# Patient Record
Sex: FEMALE | Race: WHITE | NOT HISPANIC OR LATINO | Employment: OTHER | URBAN - METROPOLITAN AREA
[De-identification: names, ages, dates, MRNs, and addresses within clinical notes are randomized per-mention and may not be internally consistent; named-entity substitution may affect disease eponyms.]

---

## 2017-12-01 ENCOUNTER — HOSPITAL ENCOUNTER (OUTPATIENT)
Dept: GENERAL RADIOLOGY | Facility: HOSPITAL | Age: 72
Discharge: HOME | End: 2017-12-01
Admitting: ORTHOPAEDIC SURGERY

## 2017-12-01 ENCOUNTER — APPOINTMENT (OUTPATIENT)
Dept: PREADMISSION TESTING | Facility: HOSPITAL | Age: 72
End: 2017-12-01

## 2017-12-01 VITALS — HEIGHT: 64 IN | WEIGHT: 146.39 LBS | BODY MASS INDEX: 24.99 KG/M2

## 2017-12-01 DIAGNOSIS — Z01.89 LABORATORY TEST: Primary | ICD-10-CM

## 2017-12-01 LAB
ABO GROUP BLD: NORMAL
ALBUMIN SERPL-MCNC: 4.2 G/DL (ref 3.2–4.8)
ALBUMIN/GLOB SERPL: 1.6 G/DL (ref 1.5–2.5)
ALP SERPL-CCNC: 67 U/L (ref 25–100)
ALT SERPL W P-5'-P-CCNC: 39 U/L (ref 7–40)
ANION GAP SERPL CALCULATED.3IONS-SCNC: 10 MMOL/L (ref 3–11)
APTT PPP: 25.9 SECONDS (ref 24–31)
AST SERPL-CCNC: 40 U/L (ref 0–33)
BILIRUB SERPL-MCNC: 0.2 MG/DL (ref 0.3–1.2)
BUN BLD-MCNC: 20 MG/DL (ref 9–23)
BUN/CREAT SERPL: 25 (ref 7–25)
CALCIUM SPEC-SCNC: 9.6 MG/DL (ref 8.7–10.4)
CHLORIDE SERPL-SCNC: 104 MMOL/L (ref 99–109)
CO2 SERPL-SCNC: 26 MMOL/L (ref 20–31)
CREAT BLD-MCNC: 0.8 MG/DL (ref 0.6–1.3)
DEPRECATED RDW RBC AUTO: 49.1 FL (ref 37–54)
ERYTHROCYTE [DISTWIDTH] IN BLOOD BY AUTOMATED COUNT: 14.4 % (ref 11.3–14.5)
GFR SERPL CREATININE-BSD FRML MDRD: 70 ML/MIN/1.73
GLOBULIN UR ELPH-MCNC: 2.7 GM/DL
GLUCOSE BLD-MCNC: 158 MG/DL (ref 70–100)
HBA1C MFR BLD: 6.6 % (ref 4.8–5.6)
HCT VFR BLD AUTO: 41.8 % (ref 34.5–44)
HGB BLD-MCNC: 13.2 G/DL (ref 11.5–15.5)
INR PPP: 0.96
MCH RBC QN AUTO: 29.1 PG (ref 27–31)
MCHC RBC AUTO-ENTMCNC: 31.6 G/DL (ref 32–36)
MCV RBC AUTO: 92.1 FL (ref 80–99)
PLATELET # BLD AUTO: 240 10*3/MM3 (ref 150–450)
PMV BLD AUTO: 10.6 FL (ref 6–12)
POTASSIUM BLD-SCNC: 4.5 MMOL/L (ref 3.5–5.5)
PROT SERPL-MCNC: 6.9 G/DL (ref 5.7–8.2)
PROTHROMBIN TIME: 10.5 SECONDS (ref 9.6–11.5)
RBC # BLD AUTO: 4.54 10*6/MM3 (ref 3.89–5.14)
RH BLD: POSITIVE
SODIUM BLD-SCNC: 140 MMOL/L (ref 132–146)
WBC NRBC COR # BLD: 6.87 10*3/MM3 (ref 3.5–10.8)

## 2017-12-01 PROCEDURE — 71020 HC CHEST PA AND LATERAL: CPT

## 2017-12-01 PROCEDURE — 36415 COLL VENOUS BLD VENIPUNCTURE: CPT

## 2017-12-01 PROCEDURE — 86901 BLOOD TYPING SEROLOGIC RH(D): CPT

## 2017-12-01 PROCEDURE — 80053 COMPREHEN METABOLIC PANEL: CPT | Performed by: ORTHOPAEDIC SURGERY

## 2017-12-01 PROCEDURE — 85027 COMPLETE CBC AUTOMATED: CPT | Performed by: ORTHOPAEDIC SURGERY

## 2017-12-01 PROCEDURE — 86900 BLOOD TYPING SEROLOGIC ABO: CPT

## 2017-12-01 PROCEDURE — 83036 HEMOGLOBIN GLYCOSYLATED A1C: CPT | Performed by: ORTHOPAEDIC SURGERY

## 2017-12-01 PROCEDURE — 85610 PROTHROMBIN TIME: CPT | Performed by: ORTHOPAEDIC SURGERY

## 2017-12-01 PROCEDURE — 85730 THROMBOPLASTIN TIME PARTIAL: CPT | Performed by: ORTHOPAEDIC SURGERY

## 2017-12-01 RX ORDER — PENICILLIN V POTASSIUM 500 MG/1
500 TABLET ORAL 4 TIMES DAILY
COMMUNITY
End: 2017-12-13 | Stop reason: HOSPADM

## 2017-12-01 RX ORDER — NITROGLYCERIN 0.4 MG/1
0.4 TABLET SUBLINGUAL
COMMUNITY

## 2017-12-01 RX ORDER — HYDROCODONE BITARTRATE AND ACETAMINOPHEN 7.5; 325 MG/1; MG/1
1 TABLET ORAL DAILY PRN
COMMUNITY
End: 2017-12-13 | Stop reason: HOSPADM

## 2017-12-01 RX ORDER — FLUOXETINE HYDROCHLORIDE 40 MG/1
40 CAPSULE ORAL DAILY
COMMUNITY

## 2017-12-01 RX ORDER — DIAZEPAM 5 MG/1
5 TABLET ORAL NIGHTLY PRN
COMMUNITY

## 2017-12-01 RX ORDER — PREDNISONE 2.5 MG
2.5 TABLET ORAL DAILY
COMMUNITY

## 2017-12-01 NOTE — PAT
"CALLED DR. CASON OFFICE AND LEFT A MESSAGE WITH NELLA THAT THE PT. IS ON ABX FOR A URI.  PT STATED THAT SHE IS FEELING \"MUCH BETTER\" AND DENIES A FEVER.  NELLA WILL FORWARD THE MESSAGE TO DR. CASON.   "

## 2017-12-11 ENCOUNTER — ANESTHESIA (OUTPATIENT)
Dept: PERIOP | Facility: HOSPITAL | Age: 72
End: 2017-12-11

## 2017-12-11 ENCOUNTER — ANESTHESIA EVENT (OUTPATIENT)
Dept: PERIOP | Facility: HOSPITAL | Age: 72
End: 2017-12-11

## 2017-12-11 ENCOUNTER — HOSPITAL ENCOUNTER (INPATIENT)
Facility: HOSPITAL | Age: 72
LOS: 2 days | Discharge: HOME HEALTH | End: 2017-12-13
Attending: ORTHOPAEDIC SURGERY | Admitting: ORTHOPAEDIC SURGERY

## 2017-12-11 ENCOUNTER — APPOINTMENT (OUTPATIENT)
Dept: GENERAL RADIOLOGY | Facility: HOSPITAL | Age: 72
End: 2017-12-11

## 2017-12-11 DIAGNOSIS — Z74.09 IMPAIRED MOBILITY AND ADLS: Primary | ICD-10-CM

## 2017-12-11 DIAGNOSIS — Z78.9 IMPAIRED MOBILITY AND ADLS: Primary | ICD-10-CM

## 2017-12-11 PROBLEM — E78.5 HLD (HYPERLIPIDEMIA): Status: ACTIVE | Noted: 2017-12-11

## 2017-12-11 PROBLEM — Z96.611 STATUS POST REVERSE TOTAL REPLACEMENT OF RIGHT SHOULDER: Status: ACTIVE | Noted: 2017-12-11

## 2017-12-11 PROBLEM — F41.9 ANXIETY AND DEPRESSION: Status: ACTIVE | Noted: 2017-12-11

## 2017-12-11 PROBLEM — M19.019 ARTHROPATHY OF SHOULDER REGION: Status: ACTIVE | Noted: 2017-12-11

## 2017-12-11 PROBLEM — F32.A ANXIETY AND DEPRESSION: Status: ACTIVE | Noted: 2017-12-11

## 2017-12-11 PROBLEM — E11.9 DM (DIABETES MELLITUS) (HCC): Status: ACTIVE | Noted: 2017-12-11

## 2017-12-11 LAB
ABO GROUP BLD: NORMAL
BLD GP AB SCN SERPL QL: NEGATIVE
GLUCOSE BLDC GLUCOMTR-MCNC: 183 MG/DL (ref 70–130)
RH BLD: POSITIVE

## 2017-12-11 PROCEDURE — 86850 RBC ANTIBODY SCREEN: CPT | Performed by: ORTHOPAEDIC SURGERY

## 2017-12-11 PROCEDURE — 25010000002 NEOSTIGMINE PER 0.5 MG: Performed by: NURSE ANESTHETIST, CERTIFIED REGISTERED

## 2017-12-11 PROCEDURE — 86923 COMPATIBILITY TEST ELECTRIC: CPT

## 2017-12-11 PROCEDURE — 73020 X-RAY EXAM OF SHOULDER: CPT

## 2017-12-11 PROCEDURE — 0RRJ00Z REPLACEMENT OF RIGHT SHOULDER JOINT WITH REVERSE BALL AND SOCKET SYNTHETIC SUBSTITUTE, OPEN APPROACH: ICD-10-PCS | Performed by: ORTHOPAEDIC SURGERY

## 2017-12-11 PROCEDURE — 63710000001 PREDNISONE PER 5 MG: Performed by: ORTHOPAEDIC SURGERY

## 2017-12-11 PROCEDURE — 25010000003 CEFAZOLIN IN DEXTROSE 2-4 GM/100ML-% SOLUTION: Performed by: ORTHOPAEDIC SURGERY

## 2017-12-11 PROCEDURE — 86901 BLOOD TYPING SEROLOGIC RH(D): CPT | Performed by: ORTHOPAEDIC SURGERY

## 2017-12-11 PROCEDURE — 25010000002 PROPOFOL 10 MG/ML EMULSION: Performed by: NURSE ANESTHETIST, CERTIFIED REGISTERED

## 2017-12-11 PROCEDURE — 25010000002 DEXAMETHASONE PER 1 MG: Performed by: NURSE ANESTHETIST, CERTIFIED REGISTERED

## 2017-12-11 PROCEDURE — C1713 ANCHOR/SCREW BN/BN,TIS/BN: HCPCS | Performed by: ORTHOPAEDIC SURGERY

## 2017-12-11 PROCEDURE — 25010000002 FENTANYL CITRATE (PF) 100 MCG/2ML SOLUTION: Performed by: ANESTHESIOLOGY

## 2017-12-11 PROCEDURE — 25010000002 ONDANSETRON PER 1 MG: Performed by: NURSE ANESTHETIST, CERTIFIED REGISTERED

## 2017-12-11 PROCEDURE — 86900 BLOOD TYPING SEROLOGIC ABO: CPT | Performed by: ORTHOPAEDIC SURGERY

## 2017-12-11 PROCEDURE — 82962 GLUCOSE BLOOD TEST: CPT

## 2017-12-11 PROCEDURE — C1776 JOINT DEVICE (IMPLANTABLE): HCPCS | Performed by: ORTHOPAEDIC SURGERY

## 2017-12-11 PROCEDURE — 25010000002 ROPIVACAINE HCL-NACL 0.2-0.9 % SOLUTION: Performed by: ANESTHESIOLOGY

## 2017-12-11 PROCEDURE — 63710000001 INSULIN LISPRO (HUMAN) PER 5 UNITS: Performed by: NURSE PRACTITIONER

## 2017-12-11 DEVICE — IMPLANTABLE DEVICE: Type: IMPLANTABLE DEVICE | Site: SHOULDER | Status: FUNCTIONAL

## 2017-12-11 DEVICE — CUP SUT UNIVERS REVERS OFFST PLS2 36 RT: Type: IMPLANTABLE DEVICE | Site: SHOULDER | Status: FUNCTIONAL

## 2017-12-11 DEVICE — GLENOSPHERE UNIVERS REVERS S/36 PLS4 LAT: Type: IMPLANTABLE DEVICE | Site: SHOULDER | Status: FUNCTIONAL

## 2017-12-11 DEVICE — STEM HUM/SHLDR UNIVERS REVERS SZ6: Type: IMPLANTABLE DEVICE | Site: SHOULDER | Status: FUNCTIONAL

## 2017-12-11 DEVICE — SCRW GLEN UNIVERS REVERS PERIPH 4.5X30MM: Type: IMPLANTABLE DEVICE | Site: SHOULDER | Status: FUNCTIONAL

## 2017-12-11 DEVICE — SCRW GLEN UNIVERS REVERS CENTRL NL 6.5X20MM: Type: IMPLANTABLE DEVICE | Site: SHOULDER | Status: FUNCTIONAL

## 2017-12-11 RX ORDER — CEFAZOLIN SODIUM 2 G/100ML
2 INJECTION, SOLUTION INTRAVENOUS EVERY 8 HOURS
Status: COMPLETED | OUTPATIENT
Start: 2017-12-11 | End: 2017-12-12

## 2017-12-11 RX ORDER — FAMOTIDINE 20 MG/1
20 TABLET, FILM COATED ORAL ONCE
Status: DISCONTINUED | OUTPATIENT
Start: 2017-12-11 | End: 2017-12-11 | Stop reason: HOSPADM

## 2017-12-11 RX ORDER — ONDANSETRON 2 MG/ML
4 INJECTION INTRAMUSCULAR; INTRAVENOUS EVERY 6 HOURS PRN
Status: DISCONTINUED | OUTPATIENT
Start: 2017-12-11 | End: 2017-12-13 | Stop reason: HOSPADM

## 2017-12-11 RX ORDER — ROPIVACAINE IN 0.9% SOD CHL/PF 0.2% 545ML
6 ELASTOMERIC PUMP, HI VARIABLE RATE INJECTION CONTINUOUS
Status: DISCONTINUED | OUTPATIENT
Start: 2017-12-11 | End: 2017-12-13 | Stop reason: HOSPADM

## 2017-12-11 RX ORDER — FAMOTIDINE 10 MG/ML
20 INJECTION, SOLUTION INTRAVENOUS ONCE
Status: DISCONTINUED | OUTPATIENT
Start: 2017-12-11 | End: 2017-12-11 | Stop reason: HOSPADM

## 2017-12-11 RX ORDER — LIDOCAINE HYDROCHLORIDE 10 MG/ML
INJECTION, SOLUTION INFILTRATION; PERINEURAL AS NEEDED
Status: DISCONTINUED | OUTPATIENT
Start: 2017-12-11 | End: 2017-12-11 | Stop reason: SURG

## 2017-12-11 RX ORDER — HYDRALAZINE HYDROCHLORIDE 20 MG/ML
10 INJECTION INTRAMUSCULAR; INTRAVENOUS EVERY 6 HOURS PRN
Status: DISCONTINUED | OUTPATIENT
Start: 2017-12-11 | End: 2017-12-13 | Stop reason: HOSPADM

## 2017-12-11 RX ORDER — SODIUM CHLORIDE, SODIUM LACTATE, POTASSIUM CHLORIDE, CALCIUM CHLORIDE 600; 310; 30; 20 MG/100ML; MG/100ML; MG/100ML; MG/100ML
9 INJECTION, SOLUTION INTRAVENOUS CONTINUOUS
Status: DISCONTINUED | OUTPATIENT
Start: 2017-12-11 | End: 2017-12-13 | Stop reason: HOSPADM

## 2017-12-11 RX ORDER — PROPOFOL 10 MG/ML
VIAL (ML) INTRAVENOUS CONTINUOUS PRN
Status: DISCONTINUED | OUTPATIENT
Start: 2017-12-11 | End: 2017-12-11 | Stop reason: SURG

## 2017-12-11 RX ORDER — FENTANYL CITRATE 50 UG/ML
50 INJECTION, SOLUTION INTRAMUSCULAR; INTRAVENOUS
Status: DISCONTINUED | OUTPATIENT
Start: 2017-12-11 | End: 2017-12-11 | Stop reason: HOSPADM

## 2017-12-11 RX ORDER — NITROGLYCERIN 0.4 MG/1
0.4 TABLET SUBLINGUAL
Status: DISCONTINUED | OUTPATIENT
Start: 2017-12-11 | End: 2017-12-13 | Stop reason: HOSPADM

## 2017-12-11 RX ORDER — DEXTROSE MONOHYDRATE 25 G/50ML
25 INJECTION, SOLUTION INTRAVENOUS
Status: DISCONTINUED | OUTPATIENT
Start: 2017-12-11 | End: 2017-12-13 | Stop reason: HOSPADM

## 2017-12-11 RX ORDER — SODIUM CHLORIDE 0.9 % (FLUSH) 0.9 %
1-10 SYRINGE (ML) INJECTION AS NEEDED
Status: DISCONTINUED | OUTPATIENT
Start: 2017-12-11 | End: 2017-12-11 | Stop reason: HOSPADM

## 2017-12-11 RX ORDER — ONDANSETRON 2 MG/ML
4 INJECTION INTRAMUSCULAR; INTRAVENOUS ONCE AS NEEDED
Status: DISCONTINUED | OUTPATIENT
Start: 2017-12-11 | End: 2017-12-11 | Stop reason: HOSPADM

## 2017-12-11 RX ORDER — GLYCOPYRROLATE 0.2 MG/ML
INJECTION INTRAMUSCULAR; INTRAVENOUS AS NEEDED
Status: DISCONTINUED | OUTPATIENT
Start: 2017-12-11 | End: 2017-12-11 | Stop reason: SURG

## 2017-12-11 RX ORDER — CEFAZOLIN SODIUM 2 G/100ML
2 INJECTION, SOLUTION INTRAVENOUS ONCE
Status: COMPLETED | OUTPATIENT
Start: 2017-12-11 | End: 2017-12-11

## 2017-12-11 RX ORDER — LIDOCAINE HYDROCHLORIDE 10 MG/ML
0.5 INJECTION, SOLUTION EPIDURAL; INFILTRATION; INTRACAUDAL; PERINEURAL ONCE AS NEEDED
Status: DISCONTINUED | OUTPATIENT
Start: 2017-12-11 | End: 2017-12-11 | Stop reason: HOSPADM

## 2017-12-11 RX ORDER — OXYCODONE AND ACETAMINOPHEN 7.5; 325 MG/1; MG/1
1 TABLET ORAL EVERY 4 HOURS PRN
Status: DISCONTINUED | OUTPATIENT
Start: 2017-12-11 | End: 2017-12-13 | Stop reason: HOSPADM

## 2017-12-11 RX ORDER — DIAZEPAM 5 MG/1
5 TABLET ORAL NIGHTLY PRN
Status: DISCONTINUED | OUTPATIENT
Start: 2017-12-11 | End: 2017-12-13 | Stop reason: HOSPADM

## 2017-12-11 RX ORDER — DOCUSATE SODIUM 100 MG/1
100 CAPSULE, LIQUID FILLED ORAL 2 TIMES DAILY PRN
Status: DISCONTINUED | OUTPATIENT
Start: 2017-12-11 | End: 2017-12-13 | Stop reason: HOSPADM

## 2017-12-11 RX ORDER — LIDOCAINE HYDROCHLORIDE 10 MG/ML
0.2 INJECTION, SOLUTION INFILTRATION; PERINEURAL ONCE
Status: COMPLETED | OUTPATIENT
Start: 2017-12-11 | End: 2017-12-11

## 2017-12-11 RX ORDER — FLUOXETINE HYDROCHLORIDE 20 MG/1
40 CAPSULE ORAL DAILY
Status: DISCONTINUED | OUTPATIENT
Start: 2017-12-11 | End: 2017-12-13 | Stop reason: HOSPADM

## 2017-12-11 RX ORDER — DEXAMETHASONE SODIUM PHOSPHATE 4 MG/ML
INJECTION, SOLUTION INTRA-ARTICULAR; INTRALESIONAL; INTRAMUSCULAR; INTRAVENOUS; SOFT TISSUE AS NEEDED
Status: DISCONTINUED | OUTPATIENT
Start: 2017-12-11 | End: 2017-12-11 | Stop reason: SURG

## 2017-12-11 RX ORDER — SODIUM CHLORIDE, SODIUM LACTATE, POTASSIUM CHLORIDE, CALCIUM CHLORIDE 600; 310; 30; 20 MG/100ML; MG/100ML; MG/100ML; MG/100ML
INJECTION, SOLUTION INTRAVENOUS CONTINUOUS PRN
Status: DISCONTINUED | OUTPATIENT
Start: 2017-12-11 | End: 2017-12-11 | Stop reason: SURG

## 2017-12-11 RX ORDER — OXYCODONE AND ACETAMINOPHEN 7.5; 325 MG/1; MG/1
2 TABLET ORAL EVERY 4 HOURS PRN
Status: DISCONTINUED | OUTPATIENT
Start: 2017-12-11 | End: 2017-12-13 | Stop reason: HOSPADM

## 2017-12-11 RX ORDER — FENTANYL CITRATE 50 UG/ML
INJECTION, SOLUTION INTRAMUSCULAR; INTRAVENOUS AS NEEDED
Status: DISCONTINUED | OUTPATIENT
Start: 2017-12-11 | End: 2017-12-11 | Stop reason: SURG

## 2017-12-11 RX ORDER — ATRACURIUM BESYLATE 10 MG/ML
INJECTION, SOLUTION INTRAVENOUS AS NEEDED
Status: DISCONTINUED | OUTPATIENT
Start: 2017-12-11 | End: 2017-12-11 | Stop reason: SURG

## 2017-12-11 RX ORDER — ONDANSETRON 4 MG/1
4 TABLET, FILM COATED ORAL EVERY 6 HOURS PRN
Status: DISCONTINUED | OUTPATIENT
Start: 2017-12-11 | End: 2017-12-13 | Stop reason: HOSPADM

## 2017-12-11 RX ORDER — PROPOFOL 10 MG/ML
VIAL (ML) INTRAVENOUS AS NEEDED
Status: DISCONTINUED | OUTPATIENT
Start: 2017-12-11 | End: 2017-12-11 | Stop reason: SURG

## 2017-12-11 RX ORDER — PREDNISONE 1 MG/1
2.5 TABLET ORAL DAILY
Status: DISCONTINUED | OUTPATIENT
Start: 2017-12-11 | End: 2017-12-13 | Stop reason: HOSPADM

## 2017-12-11 RX ORDER — NALOXONE HCL 0.4 MG/ML
0.1 VIAL (ML) INJECTION
Status: DISCONTINUED | OUTPATIENT
Start: 2017-12-11 | End: 2017-12-13 | Stop reason: HOSPADM

## 2017-12-11 RX ORDER — HYDROMORPHONE HYDROCHLORIDE 1 MG/ML
1 INJECTION, SOLUTION INTRAMUSCULAR; INTRAVENOUS; SUBCUTANEOUS EVERY 4 HOURS PRN
Status: DISCONTINUED | OUTPATIENT
Start: 2017-12-11 | End: 2017-12-13 | Stop reason: HOSPADM

## 2017-12-11 RX ORDER — BUPIVACAINE HYDROCHLORIDE 2.5 MG/ML
INJECTION, SOLUTION EPIDURAL; INFILTRATION; INTRACAUDAL AS NEEDED
Status: DISCONTINUED | OUTPATIENT
Start: 2017-12-11 | End: 2017-12-11 | Stop reason: SURG

## 2017-12-11 RX ORDER — ONDANSETRON 2 MG/ML
INJECTION INTRAMUSCULAR; INTRAVENOUS AS NEEDED
Status: DISCONTINUED | OUTPATIENT
Start: 2017-12-11 | End: 2017-12-11 | Stop reason: SURG

## 2017-12-11 RX ORDER — ASPIRIN 325 MG
325 TABLET, DELAYED RELEASE (ENTERIC COATED) ORAL DAILY
Status: DISCONTINUED | OUTPATIENT
Start: 2017-12-12 | End: 2017-12-13 | Stop reason: HOSPADM

## 2017-12-11 RX ORDER — NICOTINE POLACRILEX 4 MG
15 LOZENGE BUCCAL
Status: DISCONTINUED | OUTPATIENT
Start: 2017-12-11 | End: 2017-12-13 | Stop reason: HOSPADM

## 2017-12-11 RX ADMIN — SODIUM CHLORIDE, POTASSIUM CHLORIDE, SODIUM LACTATE AND CALCIUM CHLORIDE: 600; 310; 30; 20 INJECTION, SOLUTION INTRAVENOUS at 09:28

## 2017-12-11 RX ADMIN — CEFAZOLIN SODIUM 2 G: 2 INJECTION, SOLUTION INTRAVENOUS at 09:31

## 2017-12-11 RX ADMIN — OXYCODONE HYDROCHLORIDE AND ACETAMINOPHEN 1 TABLET: 7.5; 325 TABLET ORAL at 13:31

## 2017-12-11 RX ADMIN — FLUOXETINE HYDROCHLORIDE 40 MG: 20 CAPSULE ORAL at 13:31

## 2017-12-11 RX ADMIN — GLYCOPYRROLATE 0.4 MG: 0.2 INJECTION, SOLUTION INTRAMUSCULAR; INTRAVENOUS at 10:30

## 2017-12-11 RX ADMIN — FENTANYL CITRATE 100 MCG: 50 INJECTION, SOLUTION INTRAMUSCULAR; INTRAVENOUS at 07:50

## 2017-12-11 RX ADMIN — ATRACURIUM BESYLATE 50 MG: 10 INJECTION, SOLUTION INTRAVENOUS at 09:35

## 2017-12-11 RX ADMIN — LIDOCAINE HYDROCHLORIDE 0.2 ML: 10 INJECTION, SOLUTION EPIDURAL; INFILTRATION; INTRACAUDAL; PERINEURAL at 07:16

## 2017-12-11 RX ADMIN — PROPOFOL 150 MG: 10 INJECTION, EMULSION INTRAVENOUS at 09:35

## 2017-12-11 RX ADMIN — DEXAMETHASONE SODIUM PHOSPHATE 4 MG: 4 INJECTION, SOLUTION INTRAMUSCULAR; INTRAVENOUS at 09:42

## 2017-12-11 RX ADMIN — CEFAZOLIN SODIUM 2 G: 2 INJECTION, SOLUTION INTRAVENOUS at 16:51

## 2017-12-11 RX ADMIN — Medication 6 ML/HR: at 10:33

## 2017-12-11 RX ADMIN — BUPIVACAINE HYDROCHLORIDE 15 ML: 2.5 INJECTION, SOLUTION EPIDURAL; INFILTRATION; INTRACAUDAL; PERINEURAL at 07:58

## 2017-12-11 RX ADMIN — PREDNISONE 2.5 MG: 5 TABLET ORAL at 13:30

## 2017-12-11 RX ADMIN — PROPOFOL 99 MCG/KG/MIN: 10 INJECTION, EMULSION INTRAVENOUS at 09:35

## 2017-12-11 RX ADMIN — INSULIN LISPRO 2 UNITS: 100 INJECTION, SOLUTION INTRAVENOUS; SUBCUTANEOUS at 20:56

## 2017-12-11 RX ADMIN — LIDOCAINE HYDROCHLORIDE 50 MG: 10 INJECTION, SOLUTION INFILTRATION; PERINEURAL at 09:35

## 2017-12-11 RX ADMIN — DOCUSATE SODIUM 100 MG: 100 CAPSULE, LIQUID FILLED ORAL at 13:31

## 2017-12-11 RX ADMIN — OXYCODONE HYDROCHLORIDE AND ACETAMINOPHEN 1 TABLET: 7.5; 325 TABLET ORAL at 19:17

## 2017-12-11 RX ADMIN — EPHEDRINE SULFATE 15 MG: 50 INJECTION INTRAMUSCULAR; INTRAVENOUS; SUBCUTANEOUS at 09:53

## 2017-12-11 RX ADMIN — Medication 3 MG: at 10:30

## 2017-12-11 RX ADMIN — ONDANSETRON 4 MG: 2 INJECTION INTRAMUSCULAR; INTRAVENOUS at 10:30

## 2017-12-11 NOTE — ANESTHESIA POSTPROCEDURE EVALUATION
Patient: Henrietta Perez    Procedure Summary     Date Anesthesia Start Anesthesia Stop Room / Location    12/11/17 0928 1050 [facility] / [facility]       Procedure Diagnosis Surgeon Provider    TOTAL SHOULDER REVERSE ARTHROPLASTY RIGHT  (Right Shoulder) No diagnosis on file. MD Rodolfo Colorado MD          Anesthesia Type: general  Last vitals  /70   Temp   97.6   Pulse   68   Resp   12   SpO2   92     Post Anesthesia Care and Evaluation    Patient location during evaluation: PACU  Patient participation: complete - patient participated  Level of consciousness: sleepy but conscious  Pain score: 0  Pain management: adequate  Airway patency: patent  Anesthetic complications: No anesthetic complications  PONV Status: none  Cardiovascular status: hemodynamically stable and acceptable  Respiratory status: nonlabored ventilation, acceptable, nasal cannula and oral airway  Hydration status: acceptable

## 2017-12-11 NOTE — ANESTHESIA PROCEDURE NOTES
Airway  Urgency: elective    Airway not difficult    General Information and Staff    Patient location during procedure: OR  CRNA: FRANCHESKA TAVARES    Indications and Patient Condition  Indications for airway management: airway protection    Preoxygenated: yes  MILS not maintained throughout  Mask difficulty assessment: 1 - vent by mask    Final Airway Details  Final airway type: endotracheal airway      Successful airway: ETT  Cuffed: yes   Successful intubation technique: direct laryngoscopy  Facilitating devices/methods: intubating stylet  Endotracheal tube insertion site: oral  Blade: Gonzales  Blade size: #2  ETT size: 7.0 mm  Cormack-Lehane Classification: grade I - full view of glottis  Placement verified by: chest auscultation and capnometry   Measured from: lips  ETT to lips (cm): 20  Number of attempts at approach: 1    Additional Comments  Negative epigastric sounds, Breath sound equal bilaterally with symmetric chest rise and fall.  Atraumatic, teeth intact

## 2017-12-11 NOTE — ANESTHESIA PREPROCEDURE EVALUATION
Anesthesia Evaluation     Patient summary reviewed and Nursing notes reviewed   NPO Solid Status: > 8 hours  NPO Liquid Status: > 8 hours     Airway   Mallampati: II  TM distance: >3 FB  Neck ROM: full  no difficulty expected  Dental - normal exam     Pulmonary - normal exam   Cardiovascular   Exercise tolerance: good (4-7 METS)    Rhythm: regular  Rate: normal        Neuro/Psych  GI/Hepatic/Renal/Endo      Musculoskeletal     Abdominal    Substance History      OB/GYN          Other                                    Anesthesia Plan    ASA 2     general     intravenous induction   Anesthetic plan and risks discussed with patient.    ISB for post op pain relief

## 2017-12-11 NOTE — OP NOTE
Date; December 11, 2017    Preoperative diagnosis; right shoulder rotator cuff tear arthropathy    Postoperative diagnosis; same    Procedure; right shoulder reverse total shoulder arthroplasty    Surgeon; Jonathan Edmond M.D.    Assistant; SEBASTIAN Mathews physician assistant    Anesthesia; Gen. with endotracheal airway and interscalene catheter    Complications; none noted    Estimated blood loss; 250 mL    Implants; Arthrex reverse implant;  size 6 humeral stem, 135° neck shaft angle, 6 mm polyethylene liner, small glenoid baseplate, 36+4 glenoid sphere, 20 mm Central compression screw, 30 mm superior and inferior locking screws    Indications; 72-year-old female with severe right shoulder pain.  Physical examination, x-rays and CT arthrogram corroborated the diagnosis of rotator cuff tear arthropathy.  Failed good conservative treatment.  Counseled regarding the risks of surgery.  This includes but is not limited to anesthesia, bleeding, nerve damage, infection, the need for further surgeries, disability and death.  Particularly the complications of infection, dislocation and future loosening requiring revision were noted.    Procedure performed; identified and marked in the holding area.  Consent reviewed.  After the induction of anesthesia she was carefully padded and positioned in the 45° modified beachchair position.  Cervical spine stabilized.  Meticulous sterile prep and drape.  Surgical timeout.  Anatomic landmarks marked.  Deltopectoral approach to the shoulder was utilized.  Meticulous hemostasis.  Cephalic vein mobilized laterally.  We creation of the subacromial, subdeltoid and subcoracoid spaces with adhesions released.  Deep retractors placed.  Minimal tension.  Rotator cuff inspected.  There was complete deficiency of the supraspinatus, infraspinatus and upper half of the subscapularis.  We released the inferior half the subscapularis while palpating and protecting the axillary nerve with a Hohmann  retractor.  We released the inferior humeral capsule directly off the bone.  We externally rotated and exposed the humeral head for a standard cut at 20° version.  Minimal bone resection.  We removed inferior osteophyte development.  We moved directly to the glenoid.  We performed a labrum ectomy and again released the inferior capsule off of the inferior glenoid neck as well protecting the axillary nerve.  We had good appreciation of the glenoid vault from the preoperative CT scan and direct evaluation.  This allowed precise placement of the central pin and the glenoid vault with a good inferior positioning and slight inferior tilt.  We reamed and were very pleased at the circumferential coverage of the vault.  Peripheral reaming was followed by impaction of the small baseplate.  It was secured with a central compression screw and superior and inferior locking screws with good purchase.  The superior screws directed at the base of the coracoid.  We carefully checked and then engaged the 36+4 glenoid sphere.  The Dalton taper was fully engaged and could not be loosened.  We moved back to the humerus.  The size 6 stem had a very secure axial and rotational stability.  The metaphyseal reamer was utilized with posterior offset.  We selected the 6 mm polyethylene liner which had excellent soft tissue tension, it was not excessively tight, and had no impingement or instability.  An excellent range of motion movement was achieved.  We irrigated copiously and impacted the final humeral components.  The final construct was satisfactory.  Irrigation with antibiotics.  Closure in layers.  Please note there was no substantial subscapularis to close.  Absorbable subcuticular stitch and skin glue.  Covaderm and sling.  Anesthesia reversed and stable to the recovery room.      The wound was then closed in layers.  Deep Vicryl sutures with an absorbable subcuticular stitch and skin glue.  Covaderm dressing.  Anesthesia reversed.   Sling applied.  Stable to the recovery room.    Stable to the PACU

## 2017-12-11 NOTE — PLAN OF CARE
Problem: Perioperative Period (Adult)  Goal: Signs and Symptoms of Listed Potential Problems Will be Absent or Manageable (Perioperative Period)  Outcome: Outcome(s) achieved Date Met:  12/11/17

## 2017-12-11 NOTE — H&P
Patient Name: Henrietta Perez  MRN: [id number]  : 1945  DOS: 2017    Attending: Jonathan Edmond MD    Primary Care Provider: Basil Payan MD      Chief complaint:  Right shoulder pain    Subjective   Patient is a 72 y.o. female presented for right shoulder reverse total shoulder arthroplasty by Dr. Edmond under [place]. She tolerated surgery well and is admitted for further medical management. Her shoulder has been painful for over a year, but severe the last 4 months. She had limited ROM.    When seen postop she is doing well. Her pain is well controlled. She denies nausea, shortness of breath or chest pain. No hx of DVT or PE.     ( Above is noted, agree.  No complaints of fever chills nausea or vomiting.  Right shoulder postoperative pain is under good control with interscalene block.  Awaiting occupational therapy session.)wy    Allergies:  Allergies   Allergen Reactions   • Novocain [Procaine] Anaphylaxis and Swelling       Meds:  Prescriptions Prior to Admission   Medication Sig Dispense Refill Last Dose   • diazePAM (VALIUM) 5 MG tablet Take 5 mg by mouth At Night As Needed for Anxiety.   12/10/2017   • FLUoxetine (PROzac) 40 MG capsule Take 40 mg by mouth Daily.   12/10/2017   • HYDROcodone-acetaminophen (NORCO) 7.5-325 MG per tablet Take 1 tablet by mouth Daily As Needed for Moderate Pain .   Past Month   • nitroglycerin (NITROSTAT) 0.4 MG SL tablet Place 0.4 mg under the tongue Every 5 (Five) Minutes As Needed for Chest Pain. Take no more than 3 doses in 15 minutes.   Past Month   • penicillin v potassium (VEETID) 500 MG tablet ( took for sinus infection)wy Take 500 mg by mouth 4 (Four) Times a Day.   Past Week   • Pitavastatin Calcium 4 MG tablet Take 4 mg by mouth Every Night.   12/10/2017   • predniSONE (DELTASONE) 2.5 MG tablet Take 2.5 mg by mouth Daily.   12/10/2017       History:   Past Medical History:   Diagnosis Date   • Anxiety    • Arthritis    • Depression    • Fibromyalgia     • History of blood clotting disorder     PT IS A POOR HISTORIAN, STATED THAT A DERMATOLOGIST DX. HER WITH THIS, NOT SURE WHAT IT IS CALLED THOUGH.     • Tremor    • Wears glasses      Past Surgical History:   Procedure Laterality Date   • HYSTERECTOMY     • KNEE SURGERY     • ROTATOR CUFF REPAIR Right    • SKIN BIOPSY     • WISDOM TOOTH EXTRACTION       History reviewed. No pertinent family history.  Social History   Substance Use Topics   • Smoking status: Former Smoker     Packs/day: 1.00     Years: 25.00     Types: Cigarettes     Quit date: 5/19/2008   • Smokeless tobacco: Never Used   • Alcohol use Yes      Comment: 1 FRUITY DRINK PER WEEK   She is  with 1 child and 3 stepchildren. She is retired hairdresser.     Review of Systems  All systems were reviewed and negative except for:  Respiratory: positive for  dyspnea on exertion     Vital Signs  /72 (BP Location: Left arm, Patient Position: Lying)  Pulse 86  Temp 98.4 °F (36.9 °C) (Temporal Artery )   Resp 17  SpO2 91%    Physical Exam:    General Appearance:    Alert, cooperative, in no acute distress   Head:    Normocephalic, without obvious abnormality, atraumatic   Eyes:            Lids and lashes normal, conjunctivae and sclerae normal, no   icterus, no pallor, corneas clear    Ears:    Ears appear intact with no abnormalities noted   Neck:   No adenopathy, supple, trachea midline, no thyromegaly, no     carotid bruit    Lungs:     Clear to auscultation,respirations regular, even and                   unlabored    Heart:    Regular rhythm and normal rate, normal S1 and S2, no            murmur, no gallop, no rub, no click   Abdomen:     Normal bowel sounds, no masses, no organomegaly, soft        non-tender, non-distended, no guarding, no rebound                 tenderness   Genitalia:    Deferred   Extremities:   RUE with sling. covaderm CDI. Nerve block present. Good movement and cap refill right digits.    Pulses:   Pulses palpable and  equal bilaterally   Skin:   No bleeding, bruising or rash   Neurologic:   Cranial nerves 2 - 12 grossly intact, sensation intact (some numbness right digits)      I reviewed the patient's new clinical results.     Results for MARY CAZARES (MRN [id number]) as of 12/11/2017 15:22   Ref. Range 12/1/2017 13:50   Glucose Latest Ref Range: 70 - 100 mg/dL 158 (H)   Sodium Latest Ref Range: 132 - 146 mmol/L 140   Potassium Latest Ref Range: 3.5 - 5.5 mmol/L 4.5   CO2 Latest Ref Range: 20.0 - 31.0 mmol/L 26.0   Chloride Latest Ref Range: 99 - 109 mmol/L 104   Anion Gap Latest Ref Range: 3.0 - 11.0 mmol/L 10.0   Creatinine Latest Ref Range: 0.60 - 1.30 mg/dL 0.80   BUN Latest Ref Range: 9 - 23 mg/dL 20   BUN/Creatinine Ratio Latest Ref Range: 7.0 - 25.0  25.0   Calcium Latest Ref Range: 8.7 - 10.4 mg/dL 9.6   eGFR Non African Amer Latest Ref Range: >60 mL/min/1.73 70   Alkaline Phosphatase Latest Ref Range: 25 - 100 U/L 67   Total Protein Latest Ref Range: 5.7 - 8.2 g/dL 6.9   ALT (SGPT) Latest Ref Range: 7 - 40 U/L 39   AST (SGOT) Latest Ref Range: 0 - 33 U/L 40 (H)   Total Bilirubin Latest Ref Range: 0.3 - 1.2 mg/dL 0.2 (L)   Albumin Latest Ref Range: 3.20 - 4.80 g/dL 4.20   Globulin Latest Units: gm/dL 2.7   A/G Ratio Latest Ref Range: 1.5 - 2.5 g/dL 1.6   Hemoglobin A1C Latest Ref Range: 4.80 - 5.60 % 6.60 (H)   Protime Latest Ref Range: 9.6 - 11.5 Seconds 10.5   INR Unknown 0.96   aPTT Latest Ref Range: 24.0 - 31.0 seconds 25.9   WBC Latest Ref Range: 3.50 - 10.80 10*3/mm3 6.87   RBC Latest Ref Range: 3.89 - 5.14 10*6/mm3 4.54   Hemoglobin Latest Ref Range: 11.5 - 15.5 g/dL 13.2   Hematocrit Latest Ref Range: 34.5 - 44.0 % 41.8   RDW Latest Ref Range: 11.3 - 14.5 % 14.4   MCV Latest Ref Range: 80.0 - 99.0 fL 92.1   MCH Latest Ref Range: 27.0 - 31.0 pg 29.1   MCHC Latest Ref Range: 32.0 - 36.0 g/dL 31.6 (L)   MPV Latest Ref Range: 6.0 - 12.0 fL 10.6   Platelets Latest Ref Range: 150 - 450 10*3/mm3 240     Assessment  and Plan:   Principal Problem:    Status post reverse total replacement of right shoulder  Active Problems:    Arthropathy of shoulder region    Anxiety and depression    HLD (hyperlipidemia)    DM- new dx based on A1C      Plan  1. PT/OT- RUE sling, pendulum exercises  2. Pain control-prns, interscalene nerve block   3. IS-encourage  4. DVT proph- mechs/ASA  5. Bowel regimen  6. Resume home medications as appropriate  7. Monitor post-op labs  8. DC planning for home, likely tomorrow    Anxiety and depression  - continue home valium and prozac    HLD  - resume statin when ok with Dr. Edmond    DM- new dx  - hgb A1c on 12/1/17 6.6  - Accuchecks ACHS with low dose SSI  - DM educator consult    Seen and examined by me. Agree with above. Discussed with patient and .laya Walker MD  12/11/17  5:42 PM

## 2017-12-11 NOTE — H&P
"Pre-Op H&P  Henrietta Perez  [id number]  1945    Chief complaint: Right shoulder pain    HPI:    Patient is a 72 y.o.female presents with right shoulder pain and found to have right shoulder OA and here today for right reverse total shoulder arthroplasty.     Review of Systems:  General ROS: negative for chills, fever or skin lesions;  No changes since last office visit  Cardiovascular ROS: no chest pain or dyspnea on exertion  Respiratory ROS: no cough, shortness of breath, or wheezing  Heme:  Denies clotting disorder.  Reports she was told she had \"vasculitis\" by Dermatologist    Allergies:   Allergies   Allergen Reactions   • Novocain [Procaine] Anaphylaxis and Swelling       Home Meds:    Prescriptions Prior to Admission   Medication Sig Dispense Refill Last Dose   • diazePAM (VALIUM) 5 MG tablet Take 5 mg by mouth At Night As Needed for Anxiety.   12/10/2017   • FLUoxetine (PROzac) 40 MG capsule Take 40 mg by mouth Daily.   12/10/2017   • HYDROcodone-acetaminophen (NORCO) 7.5-325 MG per tablet Take 1 tablet by mouth Daily As Needed for Moderate Pain .   Past Month   • nitroglycerin (NITROSTAT) 0.4 MG SL tablet Place 0.4 mg under the tongue Every 5 (Five) Minutes As Needed for Chest Pain. Take no more than 3 doses in 15 minutes.   Past Month   • penicillin v potassium (VEETID) 500 MG tablet Take 500 mg by mouth 4 (Four) Times a Day.   Past Week   • Pitavastatin Calcium 4 MG tablet Take 4 mg by mouth Every Night.   12/10/2017   • predniSONE (DELTASONE) 2.5 MG tablet Take 2.5 mg by mouth Daily.   12/10/2017       PMH:   Past Medical History:   Diagnosis Date   • Anxiety    • Arthritis    • Depression    • Fibromyalgia    • History of blood clotting disorder     PT IS A POOR HISTORIAN, STATED THAT A DERMATOLOGIST DX. HER WITH THIS, NOT SURE WHAT IT IS CALLED THOUGH.     • Tremor    • Wears glasses      PSH:    Past Surgical History:   Procedure Laterality Date   • HYSTERECTOMY     • KNEE SURGERY     • ROTATOR " CUFF REPAIR Right    • SKIN BIOPSY     • WISDOM TOOTH EXTRACTION         Immunization History:  Influenza: no  Pneumococcal: yes < 5 years  Tetanus: unknown    Social History:   Tobacco:   History   Smoking Status   • Former Smoker   • Packs/day: 1.00   • Years: 25.00   • Types: Cigarettes   • Quit date: 5/19/2008   Smokeless Tobacco   • Never Used      Alcohol:     History   Alcohol Use   • Yes     Comment: 1 FRUITY DRINK PER WEEK       Vitals:           /71 (BP Location: Right arm, Patient Position: Lying)  Pulse 62  Temp 96.5 °F (35.8 °C) (Temporal Artery )   Resp 16  SpO2 95%    Physical Exam:  General Appearance:    Alert, cooperative, no distress, appears stated age   Head:    Normocephalic, without obvious abnormality, atraumatic   Lungs:     Clear to auscultation bilaterally, respirations unlabored    Heart:   Regular rate and rhythm, S1 and S2 normal, no murmur, rub    or gallop    Abdomen:    Soft, non-tender.  +bowel sounds   Breast Exam:    deferred   Genitalia:    deferred   Extremities:   Extremities normal, atraumatic, no cyanosis or edema   Skin:   Skin color, texture, turgor normal, no rashes or lesions   Neurologic:   Grossly intact   Results Review  I reviewed the patient's new clinical results.    Cancer Staging (if applicable)  Cancer Patient: __ yes _x_no __unknown; If yes, clinical stage T:__ N:__M:__, stage group or __N/A    Impression: Right shoulder osteoarthritis    Plan: Right reverse total shoulder arthroplasty    Alma Delia Hutchinson, APRN   12/11/2017   7:59 AM

## 2017-12-11 NOTE — NURSING NOTE
Acute Pain Service:  On-Q teaching completed with patient and spouse, [place].  Video demonstration, handout and bracelet provided with [facility] on call central phone number.  Instructed to call with any questions or concerns.  Patient verbalized understanding.  Service will continue to follow until catheter DC'd.  Please contact patient at Home:[phone] Cell: [phone] or [facility] Cell at [phone]  if needed.

## 2017-12-11 NOTE — ANESTHESIA PROCEDURE NOTES
Interscalene Cath    Patient location during procedure: pre-op  Stop time: 12/11/2017 7:58 AM  Reason for block: at surgeon's request and post-op pain management  Performed by  Anesthesiologist: MONROE HAGAN  Preanesthetic Checklist  Completed: patient identified, site marked, surgical consent, pre-op evaluation, timeout performed, IV checked, risks and benefits discussed and monitors and equipment checked  Prep:  Pt Position: left lateral decubitus  Sterile barriers:cap, gloves, mask and sterile barriers  Prep: ChloraPrep  Patient monitoring: blood pressure monitoring, continuous pulse oximetry and EKG  Procedure  Sedation:yes    Guidance:ultrasound guided  Images:still images obtained    Laterality:right  Block Type:interscalene  Injection Technique:catheter  Needle Type:Tuohy and echogenic  Needle Gauge:18 G    Catheter Size:20 G (20g)  Cath Depth at skin: 7 cm  Medications  Local Injected:bupivacaine 0.25% Local Amount Injected:15mL  Post Assessment  Injection Assessment: negative aspiration for heme, no paresthesia on injection and incremental injection  Patient Tolerance:comfortable throughout block  Complications:no  Additional Notes  Procedure:                 The pt was placed in semifowlers position with a slight tilt of the thorax contralateral to the insertion site.  The Insertion Site was prepped and draped in sterile fashion.  The pt was anesthetized with  IV Sedation( see meds) and  Skin and cutaneous tissue was infiltrated and anesthetized with 1% Lidocaine 3 mls via a 25g needle.  Utilizing ultrasound guidance, a BBraun 2 inch 18 g Contiplex echogenic touhy needle was advanced in-plane.  Hydro dissection of tissue was achieved with Normal saline. Major vessels(carotid and Internal Jugular) where visualized as the brachial plexus was approached at the approximate level of C-7/ T-1.  Cervical 5 and Branches of Cervical 6 nerve roots where visualized and the needle tip was placed posterior at the  level of C-6 roots.  LA spread was visualized and injection was made incrementally every 5 mls with aspiration. Injection pressure was normal or little, there was no intraneural injection, no vascular injection.      The BBraun 20 g wire stylet  catheter was then placed under US guidance on the posterior aspect of the Brachial Plexus.  Location of catheter was confirmed with NS injection visualized with US . The tuohy was then removed and the skin was sealed with Skin AFix at catheter insertion site.  Skin was prepped with mastisol and the labeled catheter  was secured with steristrips and a CHG tegaderm. Thank You.

## 2017-12-12 LAB
ANION GAP SERPL CALCULATED.3IONS-SCNC: 12 MMOL/L (ref 3–11)
BASOPHILS # BLD AUTO: 0.01 10*3/MM3 (ref 0–0.2)
BASOPHILS NFR BLD AUTO: 0.1 % (ref 0–1)
BUN BLD-MCNC: 12 MG/DL (ref 9–23)
BUN/CREAT SERPL: 17.1 (ref 7–25)
CALCIUM SPEC-SCNC: 9.4 MG/DL (ref 8.7–10.4)
CHLORIDE SERPL-SCNC: 101 MMOL/L (ref 99–109)
CO2 SERPL-SCNC: 26 MMOL/L (ref 20–31)
CREAT BLD-MCNC: 0.7 MG/DL (ref 0.6–1.3)
DEPRECATED RDW RBC AUTO: 48.6 FL (ref 37–54)
EOSINOPHIL # BLD AUTO: 0 10*3/MM3 (ref 0–0.3)
EOSINOPHIL NFR BLD AUTO: 0 % (ref 0–3)
ERYTHROCYTE [DISTWIDTH] IN BLOOD BY AUTOMATED COUNT: 14.3 % (ref 11.3–14.5)
GFR SERPL CREATININE-BSD FRML MDRD: 82 ML/MIN/1.73
GLUCOSE BLD-MCNC: 110 MG/DL (ref 70–100)
GLUCOSE BLDC GLUCOMTR-MCNC: 125 MG/DL (ref 70–130)
GLUCOSE BLDC GLUCOMTR-MCNC: 135 MG/DL (ref 70–130)
GLUCOSE BLDC GLUCOMTR-MCNC: 157 MG/DL (ref 70–130)
GLUCOSE BLDC GLUCOMTR-MCNC: 171 MG/DL (ref 70–130)
HCT VFR BLD AUTO: 37.8 % (ref 34.5–44)
HGB BLD-MCNC: 11.8 G/DL (ref 11.5–15.5)
IMM GRANULOCYTES # BLD: 0.06 10*3/MM3 (ref 0–0.03)
IMM GRANULOCYTES NFR BLD: 0.4 % (ref 0–0.6)
LYMPHOCYTES # BLD AUTO: 2.1 10*3/MM3 (ref 0.6–4.8)
LYMPHOCYTES NFR BLD AUTO: 15.3 % (ref 24–44)
MCH RBC QN AUTO: 29 PG (ref 27–31)
MCHC RBC AUTO-ENTMCNC: 31.2 G/DL (ref 32–36)
MCV RBC AUTO: 92.9 FL (ref 80–99)
MONOCYTES # BLD AUTO: 1.46 10*3/MM3 (ref 0–1)
MONOCYTES NFR BLD AUTO: 10.6 % (ref 0–12)
NEUTROPHILS # BLD AUTO: 10.09 10*3/MM3 (ref 1.5–8.3)
NEUTROPHILS NFR BLD AUTO: 73.6 % (ref 41–71)
PLATELET # BLD AUTO: 253 10*3/MM3 (ref 150–450)
PMV BLD AUTO: 10.7 FL (ref 6–12)
POTASSIUM BLD-SCNC: 4.5 MMOL/L (ref 3.5–5.5)
RBC # BLD AUTO: 4.07 10*6/MM3 (ref 3.89–5.14)
SODIUM BLD-SCNC: 139 MMOL/L (ref 132–146)
WBC NRBC COR # BLD: 13.72 10*3/MM3 (ref 3.5–10.8)

## 2017-12-12 PROCEDURE — G0108 DIAB MANAGE TRN  PER INDIV: HCPCS | Performed by: REGISTERED NURSE

## 2017-12-12 PROCEDURE — 97161 PT EVAL LOW COMPLEX 20 MIN: CPT

## 2017-12-12 PROCEDURE — 97166 OT EVAL MOD COMPLEX 45 MIN: CPT | Performed by: OCCUPATIONAL THERAPIST

## 2017-12-12 PROCEDURE — 25010000003 CEFAZOLIN IN DEXTROSE 2-4 GM/100ML-% SOLUTION: Performed by: ORTHOPAEDIC SURGERY

## 2017-12-12 PROCEDURE — 63710000001 PREDNISONE PER 5 MG: Performed by: ORTHOPAEDIC SURGERY

## 2017-12-12 PROCEDURE — 25010000002 HYDROMORPHONE PER 4 MG: Performed by: ORTHOPAEDIC SURGERY

## 2017-12-12 PROCEDURE — 85025 COMPLETE CBC W/AUTO DIFF WBC: CPT | Performed by: ORTHOPAEDIC SURGERY

## 2017-12-12 PROCEDURE — 97530 THERAPEUTIC ACTIVITIES: CPT | Performed by: OCCUPATIONAL THERAPIST

## 2017-12-12 PROCEDURE — 82962 GLUCOSE BLOOD TEST: CPT

## 2017-12-12 PROCEDURE — 80048 BASIC METABOLIC PNL TOTAL CA: CPT | Performed by: ORTHOPAEDIC SURGERY

## 2017-12-12 PROCEDURE — 25010000002 ONDANSETRON PER 1 MG: Performed by: NURSE PRACTITIONER

## 2017-12-12 RX ADMIN — PREDNISONE 2.5 MG: 5 TABLET ORAL at 08:36

## 2017-12-12 RX ADMIN — FLUOXETINE HYDROCHLORIDE 40 MG: 20 CAPSULE ORAL at 08:35

## 2017-12-12 RX ADMIN — CEFAZOLIN SODIUM 2 G: 2 INJECTION, SOLUTION INTRAVENOUS at 01:37

## 2017-12-12 RX ADMIN — OXYCODONE HYDROCHLORIDE AND ACETAMINOPHEN 1 TABLET: 7.5; 325 TABLET ORAL at 17:18

## 2017-12-12 RX ADMIN — OXYCODONE HYDROCHLORIDE AND ACETAMINOPHEN 1 TABLET: 7.5; 325 TABLET ORAL at 08:45

## 2017-12-12 RX ADMIN — HYDROMORPHONE HYDROCHLORIDE 1 MG: 2 INJECTION INTRAMUSCULAR; INTRAVENOUS; SUBCUTANEOUS at 09:48

## 2017-12-12 RX ADMIN — INSULIN LISPRO 2 UNITS: 100 INJECTION, SOLUTION INTRAVENOUS; SUBCUTANEOUS at 08:36

## 2017-12-12 RX ADMIN — OXYCODONE HYDROCHLORIDE AND ACETAMINOPHEN 1 TABLET: 7.5; 325 TABLET ORAL at 08:36

## 2017-12-12 RX ADMIN — ASPIRIN 325 MG: 325 TABLET, DELAYED RELEASE ORAL at 08:36

## 2017-12-12 RX ADMIN — ONDANSETRON 4 MG: 2 INJECTION INTRAMUSCULAR; INTRAVENOUS at 08:36

## 2017-12-12 RX ADMIN — HYDROMORPHONE HYDROCHLORIDE 1 MG: 2 INJECTION INTRAMUSCULAR; INTRAVENOUS; SUBCUTANEOUS at 18:40

## 2017-12-12 RX ADMIN — INSULIN LISPRO 2 UNITS: 100 INJECTION, SOLUTION INTRAVENOUS; SUBCUTANEOUS at 17:19

## 2017-12-12 NOTE — DISCHARGE PLACEMENT REQUEST
"Mary Perez (72 y.o. Female)     Sonja Kellerman RN,  [phone]      Date of Birth Social Security Number Address Home Phone MRN    1945  [address]  [facility] [address] [phone] [id number]    [employer] Marital Status          None        Admission Date Admission Type Admitting Provider Attending Provider Department, Room/Bed    12/11/17 Elective Jonathan Edmond MD Wilkes, Trevor, MD [address], S384/1    Discharge Date Discharge Disposition Discharge Destination                      Attending Provider: Jonathan Edmond MD     Allergies:  Novocain [Procaine]    Isolation:  None   Infection:  None   Code Status:  FULL    Ht:  162.6 cm (64\")   Wt:  66.4 kg (146 lb 6.2 oz)    Admission Cmt:  None   Principal Problem:  Status post reverse total replacement of right shoulder [Z96.611]                 Active Insurance as of 12/11/2017     Primary Coverage     Payor Plan Insurance Group Employer/Plan Group    MEDICARE MEDICARE A & B      Payor Plan Address Payor Plan Phone Number Effective From Effective To    [address] [phone] 10/1/2008     [place] [address]       Subscriber Name Subscriber Birth Date Member ID       MARY PEREZ 1945 [id number]           Secondary Coverage     Payor Plan Insurance Group Employer/Plan Group    ANTH BLUE CROSS ANTH BLUE CROSS BLUE [facility] PPO [id number]     Payor Plan Address Payor Plan Phone Number Effective From Effective To    [address] [phone] 1/1/2010     [place] [address]       Subscriber Name Subscriber Birth Date Member ID       RADHA PEREZ 11/14/1946 [id number]                 Emergency Contacts      (Rel.) Home Phone Work Phone Mobile Phone    Radha Perez (Spouse) [phone] -- --    Elba Cruz (Daughter) [phone] -- --            Insurance Information                MEDICARE/MEDICARE A & B Phone: [phone]    Subscriber: Chris Mary OLIVEROS " "Subscriber#: [id number]    Group#:  Precert#:         IDA BLUE CROSS/IDA BLUE CROSS BLUE [facility] PPO Phone: [phone]    Subscriber: Hank Perez Subscriber#: [id number]    Group#: [id number] Precert#:              History & Physical      MICHAEL Chiang at 12/11/2017  7:58 AM     Attestation signed by Jonathan Edmond MD at 12/11/2017  8:57 AM        Patient seen and examined in preop.  Agree with above.  Consent reviewed.  Plan to proceed.                               Pre-Op H&P  Henrietta Perez  [id number]  1945    Chief complaint: Right shoulder pain    HPI:    Patient is a 72 y.o.female presents with right shoulder pain and found to have right shoulder OA and here today for right reverse total shoulder arthroplasty.     Review of Systems:  General ROS: negative for chills, fever or skin lesions;  No changes since last office visit  Cardiovascular ROS: no chest pain or dyspnea on exertion  Respiratory ROS: no cough, shortness of breath, or wheezing  Heme:  Denies clotting disorder.  Reports she was told she had \"vasculitis\" by Dermatologist    Allergies:   Allergies   Allergen Reactions   • Novocain [Procaine] Anaphylaxis and Swelling       Home Meds:    Prescriptions Prior to Admission   Medication Sig Dispense Refill Last Dose   • diazePAM (VALIUM) 5 MG tablet Take 5 mg by mouth At Night As Needed for Anxiety.   12/10/2017   • FLUoxetine (PROzac) 40 MG capsule Take 40 mg by mouth Daily.   12/10/2017   • HYDROcodone-acetaminophen (NORCO) 7.5-325 MG per tablet Take 1 tablet by mouth Daily As Needed for Moderate Pain .   Past Month   • nitroglycerin (NITROSTAT) 0.4 MG SL tablet Place 0.4 mg under the tongue Every 5 (Five) Minutes As Needed for Chest Pain. Take no more than 3 doses in 15 minutes.   Past Month   • penicillin v potassium (VEETID) 500 MG tablet Take 500 mg by mouth 4 (Four) Times a Day.   Past Week   • Pitavastatin Calcium 4 MG tablet Take 4 mg by mouth Every Night.   " 12/10/2017   • predniSONE (DELTASONE) 2.5 MG tablet Take 2.5 mg by mouth Daily.   12/10/2017       PMH:   Past Medical History:   Diagnosis Date   • Anxiety    • Arthritis    • Depression    • Fibromyalgia    • History of blood clotting disorder     PT IS A POOR HISTORIAN, STATED THAT A DERMATOLOGIST DX. HER WITH THIS, NOT SURE WHAT IT IS CALLED THOUGH.     • Tremor    • Wears glasses      PSH:    Past Surgical History:   Procedure Laterality Date   • HYSTERECTOMY     • KNEE SURGERY     • ROTATOR CUFF REPAIR Right    • SKIN BIOPSY     • WISDOM TOOTH EXTRACTION         Immunization History:  Influenza: no  Pneumococcal: yes < 5 years  Tetanus: unknown    Social History:   Tobacco:   History   Smoking Status   • Former Smoker   • Packs/day: 1.00   • Years: 25.00   • Types: Cigarettes   • Quit date: 5/19/2008   Smokeless Tobacco   • Never Used      Alcohol:     History   Alcohol Use   • Yes     Comment: 1 FRUITY DRINK PER WEEK       Vitals:           /71 (BP Location: Right arm, Patient Position: Lying)  Pulse 62  Temp 96.5 °F (35.8 °C) (Temporal Artery )   Resp 16  SpO2 95%    Physical Exam:  General Appearance:    Alert, cooperative, no distress, appears stated age   Head:    Normocephalic, without obvious abnormality, atraumatic   Lungs:     Clear to auscultation bilaterally, respirations unlabored    Heart:   Regular rate and rhythm, S1 and S2 normal, no murmur, rub    or gallop    Abdomen:    Soft, non-tender.  +bowel sounds   Breast Exam:    deferred   Genitalia:    deferred   Extremities:   Extremities normal, atraumatic, no cyanosis or edema   Skin:   Skin color, texture, turgor normal, no rashes or lesions   Neurologic:   Grossly intact   Results Review  I reviewed the patient's new clinical results.    Cancer Staging (if applicable)  Cancer Patient: __ yes _x_no __unknown; If yes, clinical stage T:__ N:__M:__, stage group or __N/A    Impression: Right shoulder osteoarthritis    Plan: Right reverse  total shoulder arthroplasty    Alma Delia Hutchinson, APRN   2017   7:59 AM     Electronically signed by Jonathan Edmond MD at 2017  8:57 AM      Bossman Walker MD at 2017  3:20 PM          Patient Name: Henrietta Perez  MRN: [id number]  : 1945  DOS: 2017    Attending: Jonathan Edmond MD    Primary Care Provider: Basil Payan MD      Chief complaint:  Right shoulder pain    Subjective   Patient is a 72 y.o. female presented for right shoulder reverse total shoulder arthroplasty by Dr. Edmond under [place]. She tolerated surgery well and is admitted for further medical management. Her shoulder has been painful for over a year, but severe the last 4 months. She had limited ROM.    When seen postop she is doing well. Her pain is well controlled. She denies nausea, shortness of breath or chest pain. No hx of DVT or PE.     ( Above is noted, agree.  No complaints of fever chills nausea or vomiting.  Right shoulder postoperative pain is under good control with interscalene block.  Awaiting occupational therapy session.)wy    Allergies:  Allergies   Allergen Reactions   • Novocain [Procaine] Anaphylaxis and Swelling       Meds:  Prescriptions Prior to Admission   Medication Sig Dispense Refill Last Dose   • diazePAM (VALIUM) 5 MG tablet Take 5 mg by mouth At Night As Needed for Anxiety.   12/10/2017   • FLUoxetine (PROzac) 40 MG capsule Take 40 mg by mouth Daily.   12/10/2017   • HYDROcodone-acetaminophen (NORCO) 7.5-325 MG per tablet Take 1 tablet by mouth Daily As Needed for Moderate Pain .   Past Month   • nitroglycerin (NITROSTAT) 0.4 MG SL tablet Place 0.4 mg under the tongue Every 5 (Five) Minutes As Needed for Chest Pain. Take no more than 3 doses in 15 minutes.   Past Month   • penicillin v potassium (VEETID) 500 MG tablet ( took for sinus infection)wy Take 500 mg by mouth 4 (Four) Times a Day.   Past Week   • Pitavastatin Calcium 4 MG tablet Take 4 mg by mouth Every Night.    12/10/2017   • predniSONE (DELTASONE) 2.5 MG tablet Take 2.5 mg by mouth Daily.   12/10/2017       History:   Past Medical History:   Diagnosis Date   • Anxiety    • Arthritis    • Depression    • Fibromyalgia    • History of blood clotting disorder     PT IS A POOR HISTORIAN, STATED THAT A DERMATOLOGIST DX. HER WITH THIS, NOT SURE WHAT IT IS CALLED THOUGH.     • Tremor    • Wears glasses      Past Surgical History:   Procedure Laterality Date   • HYSTERECTOMY     • KNEE SURGERY     • ROTATOR CUFF REPAIR Right    • SKIN BIOPSY     • WISDOM TOOTH EXTRACTION       History reviewed. No pertinent family history.  Social History   Substance Use Topics   • Smoking status: Former Smoker     Packs/day: 1.00     Years: 25.00     Types: Cigarettes     Quit date: 5/19/2008   • Smokeless tobacco: Never Used   • Alcohol use Yes      Comment: 1 FRUITY DRINK PER WEEK   She is  with 1 child and 3 stepchildren. She is retired hairdresser.     Review of Systems  All systems were reviewed and negative except for:  Respiratory: positive for  dyspnea on exertion     Vital Signs  /72 (BP Location: Left arm, Patient Position: Lying)  Pulse 86  Temp 98.4 °F (36.9 °C) (Temporal Artery )   Resp 17  SpO2 91%    Physical Exam:    General Appearance:    Alert, cooperative, in no acute distress   Head:    Normocephalic, without obvious abnormality, atraumatic   Eyes:            Lids and lashes normal, conjunctivae and sclerae normal, no   icterus, no pallor, corneas clear    Ears:    Ears appear intact with no abnormalities noted   Neck:   No adenopathy, supple, trachea midline, no thyromegaly, no     carotid bruit    Lungs:     Clear to auscultation,respirations regular, even and                   unlabored    Heart:    Regular rhythm and normal rate, normal S1 and S2, no            murmur, no gallop, no rub, no click   Abdomen:     Normal bowel sounds, no masses, no organomegaly, soft        non-tender, non-distended, no  guarding, no rebound                 tenderness   Genitalia:    Deferred   Extremities:   RUE with sling. covaderm CDI. Nerve block present. Good movement and cap refill right digits.    Pulses:   Pulses palpable and equal bilaterally   Skin:   No bleeding, bruising or rash   Neurologic:   Cranial nerves 2 - 12 grossly intact, sensation intact (some numbness right digits)      I reviewed the patient's new clinical results.     Results for MARY CAZARES (MRN [id number]) as of 12/11/2017 15:22   Ref. Range 12/1/2017 13:50   Glucose Latest Ref Range: 70 - 100 mg/dL 158 (H)   Sodium Latest Ref Range: 132 - 146 mmol/L 140   Potassium Latest Ref Range: 3.5 - 5.5 mmol/L 4.5   CO2 Latest Ref Range: 20.0 - 31.0 mmol/L 26.0   Chloride Latest Ref Range: 99 - 109 mmol/L 104   Anion Gap Latest Ref Range: 3.0 - 11.0 mmol/L 10.0   Creatinine Latest Ref Range: 0.60 - 1.30 mg/dL 0.80   BUN Latest Ref Range: 9 - 23 mg/dL 20   BUN/Creatinine Ratio Latest Ref Range: 7.0 - 25.0  25.0   Calcium Latest Ref Range: 8.7 - 10.4 mg/dL 9.6   eGFR Non African Amer Latest Ref Range: >60 mL/min/1.73 70   Alkaline Phosphatase Latest Ref Range: 25 - 100 U/L 67   Total Protein Latest Ref Range: 5.7 - 8.2 g/dL 6.9   ALT (SGPT) Latest Ref Range: 7 - 40 U/L 39   AST (SGOT) Latest Ref Range: 0 - 33 U/L 40 (H)   Total Bilirubin Latest Ref Range: 0.3 - 1.2 mg/dL 0.2 (L)   Albumin Latest Ref Range: 3.20 - 4.80 g/dL 4.20   Globulin Latest Units: gm/dL 2.7   A/G Ratio Latest Ref Range: 1.5 - 2.5 g/dL 1.6   Hemoglobin A1C Latest Ref Range: 4.80 - 5.60 % 6.60 (H)   Protime Latest Ref Range: 9.6 - 11.5 Seconds 10.5   INR Unknown 0.96   aPTT Latest Ref Range: 24.0 - 31.0 seconds 25.9   WBC Latest Ref Range: 3.50 - 10.80 10*3/mm3 6.87   RBC Latest Ref Range: 3.89 - 5.14 10*6/mm3 4.54   Hemoglobin Latest Ref Range: 11.5 - 15.5 g/dL 13.2   Hematocrit Latest Ref Range: 34.5 - 44.0 % 41.8   RDW Latest Ref Range: 11.3 - 14.5 % 14.4   MCV Latest Ref Range: 80.0 -  99.0 fL 92.1   MCH Latest Ref Range: 27.0 - 31.0 pg 29.1   MCHC Latest Ref Range: 32.0 - 36.0 g/dL 31.6 (L)   MPV Latest Ref Range: 6.0 - 12.0 fL 10.6   Platelets Latest Ref Range: 150 - 450 10*3/mm3 240     Assessment and Plan:   Principal Problem:    Status post reverse total replacement of right shoulder  Active Problems:    Arthropathy of shoulder region    Anxiety and depression    HLD (hyperlipidemia)    DM- new dx based on A1C      Plan  1. PT/OT- RUE sling, pendulum exercises  2. Pain control-prns, interscalene nerve block   3. IS-encourage  4. DVT proph- mechs/ASA  5. Bowel regimen  6. Resume home medications as appropriate  7. Monitor post-op labs  8. DC planning for home, likely tomorrow    Anxiety and depression  - continue home valium and prozac    HLD  - resume statin when ok with Dr. Edmond    DM- new dx  - hgb A1c on 12/1/17 6.6  - Accuchecks ACHS with low dose SSI  - DM educator consult    Seen and examined by me. Agree with above. Discussed with patient and .laya Walker MD  12/11/17  5:42 PM     Electronically signed by Bossman Walker MD at 12/11/2017  5:44 PM           Operative/Procedure Notes (all)      Jonathan Edmond MD at 12/11/2017  9:26 AM  Version 1 of 1         Date; December 11, 2017    Preoperative diagnosis; right shoulder rotator cuff tear arthropathy    Postoperative diagnosis; same    Procedure; right shoulder reverse total shoulder arthroplasty    Surgeon; Jonathan Edmond M.D.    Assistant; SEBASTIAN Mathews physician assistant    Anesthesia; Gen. with endotracheal airway and interscalene catheter    Complications; none noted    Estimated blood loss; 250 mL    Implants; Arthrex reverse implant;  size 6 humeral stem, 135° neck shaft angle, 6 mm polyethylene liner, small glenoid baseplate, 36+4 glenoid sphere, 20 mm Central compression screw, 30 mm superior and inferior locking screws    Indications; 72-year-old female with severe right shoulder pain.  Physical  examination, x-rays and CT arthrogram corroborated the diagnosis of rotator cuff tear arthropathy.  Failed good conservative treatment.  Counseled regarding the risks of surgery.  This includes but is not limited to anesthesia, bleeding, nerve damage, infection, the need for further surgeries, disability and death.  Particularly the complications of infection, dislocation and future loosening requiring revision were noted.    Procedure performed; identified and marked in the holding area.  Consent reviewed.  After the induction of anesthesia she was carefully padded and positioned in the 45° modified beachchair position.  Cervical spine stabilized.  Meticulous sterile prep and drape.  Surgical timeout.  Anatomic landmarks marked.  Deltopectoral approach to the shoulder was utilized.  Meticulous hemostasis.  Cephalic vein mobilized laterally.  We creation of the subacromial, subdeltoid and subcoracoid spaces with adhesions released.  Deep retractors placed.  Minimal tension.  Rotator cuff inspected.  There was complete deficiency of the supraspinatus, infraspinatus and upper half of the subscapularis.  We released the inferior half the subscapularis while palpating and protecting the axillary nerve with a Hohmann retractor.  We released the inferior humeral capsule directly off the bone.  We externally rotated and exposed the humeral head for a standard cut at 20° version.  Minimal bone resection.  We removed inferior osteophyte development.  We moved directly to the glenoid.  We performed a labrum ectomy and again released the inferior capsule off of the inferior glenoid neck as well protecting the axillary nerve.  We had good appreciation of the glenoid vault from the preoperative CT scan and direct evaluation.  This allowed precise placement of the central pin and the glenoid vault with a good inferior positioning and slight inferior tilt.  We reamed and were very pleased at the circumferential coverage of the  vault.  Peripheral reaming was followed by impaction of the small baseplate.  It was secured with a central compression screw and superior and inferior locking screws with good purchase.  The superior screws directed at the base of the coracoid.  We carefully checked and then engaged the 36+4 glenoid sphere.  The Dalton taper was fully engaged and could not be loosened.  We moved back to the humerus.  The size 6 stem had a very secure axial and rotational stability.  The metaphyseal reamer was utilized with posterior offset.  We selected the 6 mm polyethylene liner which had excellent soft tissue tension, it was not excessively tight, and had no impingement or instability.  An excellent range of motion movement was achieved.  We irrigated copiously and impacted the final humeral components.  The final construct was satisfactory.  Irrigation with antibiotics.  Closure in layers.  Please note there was no substantial subscapularis to close.  Absorbable subcuticular stitch and skin glue.  Covaderm and sling.  Anesthesia reversed and stable to the recovery room.      The wound was then closed in layers.  Deep Vicryl sutures with an absorbable subcuticular stitch and skin glue.  Covaderm dressing.  Anesthesia reversed.  Sling applied.  Stable to the recovery room.    Stable to the PACU     Electronically signed by Jonathan Edmond MD at 12/11/2017 10:41 AM

## 2017-12-12 NOTE — PLAN OF CARE
Problem: Patient Care Overview (Adult)  Goal: Plan of Care Review  Outcome: Ongoing (interventions implemented as appropriate)    12/12/17 1538   Coping/Psychosocial Response Interventions   Plan Of Care Reviewed With patient   Patient Care Overview   Progress improving   Outcome Evaluation   Outcome Summary/Follow up Plan Pt ambulates in [place] 150 ft with CGA, 8 steps total. Pt with altered gait pattern and 1 LOB in standing. PT will continue during stay. Expected to need 24 hour assist at home.         Problem: Inpatient Physical Therapy  Goal: Bed Mobility Goal LTG- PT  Outcome: Ongoing (interventions implemented as appropriate)    12/12/17 1538   Bed Mobility PT LTG   Bed Mobility PT LTG, Date Established 12/12/17   Bed Mobility PT LTG, Time to Achieve 2 wks   Bed Mobility PT LTG, Activity Type supine to sit/sit to supine   Bed Mobility PT LTG, [place] Level contact guard assist       Goal: Transfer Training Goal 1 LTG- PT  Outcome: Ongoing (interventions implemented as appropriate)    12/12/17 1538   Transfer Training PT LTG   Transfer Training PT LTG, Date Established 12/12/17   Transfer Training PT LTG, Time to Achieve 2 - 3 days   Transfer Training PT LTG, Activity Type sit to stand/stand to sit   Transfer Training PT LTG, [place] Level independent       Goal: Gait Training Goal LTG- PT  Outcome: Ongoing (interventions implemented as appropriate)    12/12/17 1538   Gait Training PT LTG   Gait Training Goal PT LTG, Date Established 12/12/17   Gait Training Goal PT LTG, Time to Achieve 2 - 3 days   Gait Training Goal PT LTG, [place] Level independent   Gait Training Goal PT LTG, Distance to Achieve 150       Goal: Stair Training Goal LTG- PT  Outcome: Ongoing (interventions implemented as appropriate)    12/12/17 1538   Stair Training PT LTG   Stair Training Goal PT LTG, Date Established 12/12/17   Stair Training Goal PT LTG, Time to Achieve 2 - 3 days   Stair Training Goal PT LTG, Number of  Steps 3   Stair Training Goal PT LTG, [place] Level supervision required   Stair Training Goal PT LTG, Assist Device cane, straight

## 2017-12-12 NOTE — PROGRESS NOTES
YANET Bamberg    Acute pain service Inpatient Progress Note    Patient Name: Henrietta Perez  :  1945  MRN:  [id number]        Acute Pain  Service Inpatient Progress Note:    Analgesia:Good  Pain Score:7/10  LOC: alert and awake  Resp Status: room air  Cardiac: VS stable  Side Effects:None  Cath type: peripheral nerve cath with ON Q  Infusion rate: 6ml/hr  Catheter Plan:Catheter to remain Insitu and Continue catheter infusion rate unchanged  Comments: Axillary pain = 7, Leaking dressingJeny will change the dressing this am

## 2017-12-12 NOTE — NURSING NOTE
Acute Pain Service:  Catheter leaking, dressing loose and saturated. Changed dressing sterile technique.  Skin affix, steristrips and new CHG Tegaderm applied.  Catheter 8 cm at skin.

## 2017-12-12 NOTE — PLAN OF CARE
Problem: Patient Care Overview (Adult)  Goal: Plan of Care Review  Outcome: Ongoing (interventions implemented as appropriate)    12/12/17 0537   Coping/Psychosocial Response Interventions   Plan Of Care Reviewed With patient   Patient Care Overview   Progress improving   Outcome Evaluation   Outcome Summary/Follow up Plan PT. RESTED WELL DURING THE NIGHT.

## 2017-12-12 NOTE — PROGRESS NOTES
Discharge Planning Assessment  [facility]     Patient Name: Henrietta Perez  MRN: [id number]  Today's Date: 12/12/2017    Admit Date: 12/11/2017          Discharge Needs Assessment       12/12/17 1522    Living Environment    Lives With spouse    Living Arrangements house    Home Accessibility bed and bath on same level;stairs to enter home    Number of Stairs to Enter Home 3    Stair Railings at Home none    Type of Financial/Environmental Concern none    Transportation Available car;family or friend will provide    Living Environment    Provides Primary Care For no one    Primary Care Provided By spouse/significant other    Quality Of Family Relationships supportive;helpful;involved    Able to Return to Prior Living Arrangements yes    Discharge Needs Assessment    Concerns To Be Addressed basic needs concerns;discharge planning concerns    Readmission Within The Last 30 Days no previous admission in last 30 days    Outpatient/Agency/Support Group Needs homecare agency (specify level of care)    Anticipated Changes Related to Illness inability to care for self    Equipment Currently Used at Home none    Equipment Needed After Discharge none    Discharge Disposition home healthcare service    Discharge Contact Information if Applicable Hank Preez [phone] ( spouse)            Discharge Plan       12/12/17 152    Case Management/Social Work Plan    Plan Home with     Patient/Family In Agreement With Plan yes    Additional Comments I met wiht Mrs Perez and  at bedside to discuss d/c plan. Her plan is to return home.  will be home full time to assist with care as needed. Prior to admit she was independent with all ADL's, driving, etc. She uses no euqipment. She has insurance for Rx meds. We discussed Home Health servies. Home Health list/options presented. She has chosen [facility] . I have contacted them at [phone] and spoken with Leonora who accepted referral for PT/OT, orders &  clinicals faxed to [phone]. They will plan on seeing Mrs Perez in her home on Thursday 12/14.        Discharge Placement     No information found        Expected Discharge Date and Time     Expected Discharge Date Expected Discharge Time    Dec 15, 2017               Demographic Summary       12/12/17 1520    Referral Information    Admission Type inpatient    Arrived From home or self-care    Referral Source physician    Reason For Consult discharge planning    Record Reviewed history and physical;medical record    Contact Information    Permission Granted to Share Information With ;family/designee    Primary Care Physician Information    Name Bear Payan            Functional Status       12/12/17 1520    Functional Status Prior    Ambulation 0-->independent    Transferring 0-->independent    Toileting 0-->independent    Bathing 0-->independent    Dressing 0-->independent    Eating 0-->independent    Communication 0-->understands/communicates without difficulty    Swallowing 0-->swallows foods/liquids without difficulty    IADL    Medications independent    Meal Preparation independent    Housekeeping independent    Laundry independent    Shopping independent    Oral Care independent    Activity Tolerance    Current Activity Limitations non-weight bearing, upper extremity right    Cognitive/Perceptual/Developmental    Current Mental Status/Cognitive Functioning no deficits noted    Recent Changes in Mental Status/Cognitive Functioning no changes    Employment/Financial    Employment/Finance Comments Medicare & [place]            Psychosocial     None            Abuse/Neglect     None            Legal     None            Substance Abuse     None            Patient Forms     None          Sonja C Kellerman, RN

## 2017-12-12 NOTE — PLAN OF CARE
Problem: Patient Care Overview (Adult)  Goal: Plan of Care Review  Outcome: Ongoing (interventions implemented as appropriate)    12/12/17 1445   Coping/Psychosocial Response Interventions   Plan Of Care Reviewed With patient   Outcome Evaluation   Outcome Summary/Follow up Plan Pt s/p reverse TSA and now with good pain control and understanding of precautions. Pt with acute onset of nausea during session and requests to stay overnight. Pt to have 24/7 assist by her .  present during teaching and completed teach back on sling donning/doffing and HEP.          Problem: Inpatient Occupational Therapy  Goal: Transfer Training Goal 1 LTG- OT  Outcome: Ongoing (interventions implemented as appropriate)    12/12/17 1445   Transfer Training OT LTG   Transfer Training OT LTG, Time to Achieve 3 days   Transfer Training OT LTG, Activity Type toilet;bed to chair /chair to bed   Transfer Training OT LTG, [place] Level supervision required   Transfer Training OT LTG, Outcome goal ongoing       Goal: Range of Motion Goal LTG- OT  Outcome: Ongoing (interventions implemented as appropriate)    12/12/17 1445   Range of Motion OT LTG   Range of Motion Goal OT LTG, Time to Achieve 3 days   Range of Motion Goal OT LTG, Additional Goal Pt/family to complete correct completion of RUE ROM HEP per MD protocol by d/c    Range of Motion Goal OT LTG, Outcome goal ongoing       Goal: UB Dressing Goal LTG- OT  Outcome: Ongoing (interventions implemented as appropriate)    12/12/17 1445   UB Dressing OT LTG   UB Dressing Goal OT LTG, Time to Achieve 3 days   UB Dressing Goal OT LTG, [place] Level moderate assist (50% patient effort)   UB Dressing Goal OT LTG, Additional Goal pt/family to complete correct donning/doffing of sling and UB clothing by d/c    UB Dressing Goal OT LTG, Outcome goal ongoing

## 2017-12-12 NOTE — THERAPY EVALUATION
Acute Care - Occupational Therapy Initial Evaluation  [facility]     Patient Name: Henrietta Perez  : 1945  MRN: [id number]  Today's Date: 2017  Onset of Illness/Injury or Date of Surgery Date: 17  Date of Referral to OT: 17  Referring Physician: MD Mayito    Admit Date: 2017       ICD-10-CM ICD-9-CM   1. Impaired mobility and ADLs Z74.09 799.89     Patient Active Problem List   Diagnosis   • Arthropathy of shoulder region   • Status post reverse total replacement of right shoulder   • Anxiety and depression   • HLD (hyperlipidemia)   • DM- new dx based on A1C     Past Medical History:   Diagnosis Date   • Anxiety    • Arthritis    • Depression    • Fibromyalgia    • History of blood clotting disorder     PT IS A POOR HISTORIAN, STATED THAT A DERMATOLOGIST DX. HER WITH THIS, NOT SURE WHAT IT IS CALLED THOUGH.     • Tremor    • Wears glasses      Past Surgical History:   Procedure Laterality Date   • HYSTERECTOMY     • KNEE SURGERY     • ROTATOR CUFF REPAIR Right    • SKIN BIOPSY     • TOTAL SHOULDER ARTHROPLASTY W/ DISTAL CLAVICLE EXCISION Right 2017    Procedure: TOTAL SHOULDER REVERSE ARTHROPLASTY RIGHT ;  Surgeon: Jonathan Edmond MD;  Location: [facility];  Service:    • WISDOM TOOTH EXTRACTION            OT ASSESSMENT FLOWSHEET (last 72 hours)      OT Evaluation       17 1215 17 1214 17 1127 17 0710 17    Rehab Evaluation    Document Type    evaluation;therapy note (daily note)  -ST     Subjective Information    no complaints;agree to therapy  -ST     Evaluation Not Performed   patient/family declined evaluation  -EH      Patient Effort, Rehab Treatment    good  -ST     Symptoms Noted During/After Treatment    increased pain;other (see comments)   nausea  -ST     Symptoms Noted Comment    RN notified and brought meds  -ST     General Information    Patient Profile Review    yes  -ST     Onset of Illness/Injury or Date of Surgery Date     12/11/17  -ST     Referring Physician    MD Mayito  -     General Observations    pt supine upon arrival;  present; on-Q ball and IV intact  -ST     Pertinent History Of Current Problem    Pt admitted for sx management of long-standing R shoulder pain and dysfunction that failed to improve w/conservative measures and impacted QOL and daily tasks such and dressing and bathing. Pt with R shoulder RCT and now s/p R shoulder reverse TSA.   -ST     Precautions/Limitations    fall precautions;other (see comments)   on-Q, dizziness, sling, ROM per MD protocol   -ST     Prior Level of Function    --   difficulty with dressing and bathing d/t pain and dec. ROM  -ST     Equipment Currently Used at Home none  -SK   cane, straight   was not using PTA   -ST     Plans/Goals Discussed With    patient;agreed upon  -ST     Risks Reviewed    patient:;spouse/S.O.:;LOB;nausea/vomiting;dizziness;increased discomfort;change in vital signs  -ST     Benefits Reviewed    patient:;spouse/S.O.:;improve function;increase independence;increase balance;decrease pain;increase knowledge  -ST     Barriers to Rehab    none identified  -ST     Living Environment    Lives With --  -SK   spouse  -ST     Living Arrangements --  -SK   house  -ST     Home Accessibility --  -SK   bed and bath on same level;stairs to enter home  -ST     Number of Stairs to Enter Home --  -SK   3  -ST     Stair Railings at Home --  -SK   none  -ST     Type of Financial/Environmental Concern --  -SK        Transportation Available --  -SK        Living Environment Comment    walk-in shower;  24/7   -ST     Clinical Impression    Date of Referral to OT    12/12/17  -ST     OT Diagnosis    impaired ADLs; OT to complete: pendulum exercises 5-6x/day R shoulder and per MD preference digit, wrist, elbow f/e, pronation/supination  -ST     Prognosis    good  -ST     Patient/Family Goals Statement    return home, decrease pain  -ST     Criteria for Skilled Therapeutic  Interventions Met    yes  -ST     Rehab Potential    good, to achieve stated therapy goals  -ST     Therapy Frequency    daily  -ST     Anticipated Equipment Needs At Discharge    --   TBD  -ST     Anticipated Discharge Disposition    home with assist  -ST     Functional Level Prior    Ambulation  --  -SK       Transferring  --  -SK       Toileting  --  -SK       Bathing  --  -SK       Dressing  --  -SK       Eating  --  -SK       Communication  --  -SK       Swallowing  --  -SK       Pain Assessment    Pain Assessment    0-10  -ST     Pain Score    3  -ST     Post Pain Score    7  -ST     Pain Type    Acute pain  -ST     Pain Location    Shoulder  -ST     Pain Orientation    Right  -ST     Pain Intervention(s)    Repositioned  -ST     Cognitive Assessment/Intervention    Current Cognitive/Communication Assessment    functional  -ST     Orientation Status    oriented x 4  -ST     Follows Commands/Answers Questions    100% of the time  -ST     Personal Safety    WNL/WFL  -ST     Personal Safety Interventions    fall prevention program maintained;gait belt;nonskid shoes/slippers when out of bed  -ST     ROM (Range of Motion)    General ROM Detail    R shoulder not formally tested d/t sx precautions, L UE ROM WFL for ADL performance   -ST     MMT (Manual Muscle Testing)    General MMT Assessment Detail    R shoulder not formally tested d/t sx precautions, L UE ROM WFL for ADL performance   -ST     Muscle Tone Assessment    Muscle Tone Assessment     LUE;RUE  -    LUE Muscle Tone Assessment     severely decreased tone  -    RUE Muscle Tone Assessment     associated movements noted  -    Bed Mobility, Assessment/Treatment    Bed Mobility, Assistive Device    bed rails;head of bed elevated  -     Bed Mobility, Scoot/Bridge, [place]    conditional independence  -     Bed Mob, Supine to Sit, [place]    set up required  -ST     Transfer Assessment/Treatment    Transfer, Comment    deferred dizziness and  nausea after sitting EOB for dressing education and donning/doffing sling practice with    -     Upper Body Bathing Assessment/Training    UB Bathing Assess/Train, Comment    educated on axillary care and precautions   -     Upper Body Dressing Assessment/Training    UB Dressing Assess/Train, Clothing Type    doffing:;donning:  -     UB Dressing Assess/Train, Assist Device    teresa technique  -     UB Dressing Assess/Train, Position    sitting  -     UB Dressing Assess/Train, [place]    maximum assist (25% patient effort)  -     UB Dressing Assess/Train, Impairments    ROM decreased;other (see comments)   shoulder ROM precautions  -     UB Dressing Assess/Train, Comment     completed donning/doffing s/p education by OT for carryover at home   -     Lower Body Dressing Assessment/Training    LB Dressing Assess/Train, Position    supine  -     LB Dressing Assess/Train, [place]    maximum assist (25% patient effort)  -     LB Dressing Assess/Train, Impairments    ROM decreased  -     Therapy Exercises    Right Upper Extremity    PROM:;AROM:;10 reps   AROM digit, wrist, elbow F/E, pronation/supination, pendulum  -     RUE Resistance    --   exercises completed EOB per MD protocol   -     Sensory Assessment/Intervention    Light Touch    E  -     RUE Light Touch    mild impairment   d/t nerve cath  -     General Therapy Interventions    Planned Therapy Interventions    ADL retraining;balance training;bed mobility training;home exercise program;ROM (Range of Motion);transfer training  -     Range Of Motion    completed HEP per MD protocl   -     Positioning and Restraints    Pre-Treatment Position    in bed  -     Post Treatment Position    bed  -ST     In Bed    supine;call light within reach;encouraged to call for assist;with family/caregiver;with nsg;RUE elevated  -ST       12/11/17 1300                General Information    Equipment Currently Used at Home  none  -LORNA          User Key  (r) = Recorded By, (t) = Taken By, (c) = Cosigned By    Initials Name Effective Dates    EH Elba AGARWAL [place], PT 06/19/15 -     ST Fatoumata HERRERA Sharon, OTR 02/20/17 -     SK Sonja C Kellerman, RN 03/14/16 -     LORNA Nuria Bajwa, RN 06/16/16 -      Steph Luis, ERI 06/16/16 -            Occupational Therapy Education     Title: PT OT SLP Therapies (Done)     Topic: Occupational Therapy (Done)     Point: ADL training (Done)    Description: Instruct learner(s) on proper safety adaptation and remediation techniques during self care or transfers.   Instruct in proper use of assistive devices.    Learning Progress Summary    Learner Readiness Method Response Comment Documented by Status   Patient Acceptance E,TB,D,H VU,DU role of OT, benfits of activity, bed mobility, axillary care, shoulder precautions, sling use/donning/doffing, teresa dressing ST 12/12/17 1445 Done   Family Acceptance E,TB,D,H VU,DU role of OT, benfits of activity, bed mobility, axillary care, shoulder precautions, sling use/donning/doffing, teresa dressing ST 12/12/17 1445 Done               Point: Home exercise program (Done)    Description: Instruct learner(s) on appropriate technique for monitoring, assisting and/or progressing therapeutic exercises/activities.    Learning Progress Summary    Learner Readiness Method Response Comment Documented by Status   Patient Acceptance E,TB,D,H VU,DU role of OT, benfits of activity, bed mobility, axillary care, shoulder precautions, sling use/donning/doffing, teresa dressing ST 12/12/17 1445 Done   Family Acceptance E,TB,D,H VU,DU role of OT, benfits of activity, bed mobility, axillary care, shoulder precautions, sling use/donning/doffing, teresa dressing ST 12/12/17 1445 Done               Point: Precautions (Done)    Description: Instruct learner(s) on prescribed precautions during self-care and functional transfers.    Learning Progress Summary    Learner Readiness Method  Response Comment Documented by Status   Patient Acceptance E,TB,ROBYN,H SARAH,ROSENDO role of OT, benfits of activity, bed mobility, axillary care, shoulder precautions, sling use/donning/doffing, teresa dressing ST 12/12/17 1445 Done   Family Acceptance E,TB,ROBYN,H SARAH,ROSENDO role of OT, benfits of activity, bed mobility, axillary care, shoulder precautions, sling use/donning/doffing, teresa dressing ST 12/12/17 1445 Done               Point: Body mechanics (Done)    Description: Instruct learner(s) on proper positioning and spine alignment during self-care, functional mobility activities and/or exercises.    Learning Progress Summary    Learner Readiness Method Response Comment Documented by Status   Patient Acceptance E,TB,ROBYN,H SARAH,ROSENDO role of OT, benfits of activity, bed mobility, axillary care, shoulder precautions, sling use/donning/doffing, teresa dressing ST 12/12/17 1445 Done   Family Acceptance CLARI ANDUJAR D,H ROSENDO SMITH role of OT, benfits of activity, bed mobility, axillary care, shoulder precautions, sling use/donning/doffing, teresa dressing ST 12/12/17 1445 Done                      User Key     Initials Effective Dates Name Provider Type Discipline    ST 02/20/17 -  Fatoumata Herrera OTR Occupational Therapist OT                  OT Recommendation and Plan  Anticipated Equipment Needs At Discharge:  (TBD)  Anticipated Discharge Disposition: home with assist  Planned Therapy Interventions: ADL retraining, balance training, bed mobility training, home exercise program, ROM (Range of Motion), transfer training  Therapy Frequency: daily  Plan of Care Review  Plan Of Care Reviewed With: patient  Outcome Summary/Follow up Plan: Pt s/p reverse TSA and now with good pain control and understanding of precautions. Pt with acute onset of nausea during session and requests to stay overnight. Pt to have 24/7 assist by her .  present during teaching and completed teach back on sling donning/doffing and HEP.           OT Goals       12/12/17  1445          Transfer Training OT LTG    Transfer Training OT LTG, Time to Achieve 3 days  -ST      Transfer Training OT LTG, Activity Type toilet;bed to chair /chair to bed  -ST      Transfer Training OT LTG, [place] Level supervision required  -ST      Transfer Training OT LTG, Outcome goal ongoing  -ST      Range of Motion OT LTG    Range of Motion Goal OT LTG, Time to Achieve 3 days  -ST      Range of Motion Goal OT LTG, Additional Goal Pt/family to complete correct completion of RUE ROM HEP per MD protocol by d/c   -ST      Range of Motion Goal OT LTG, Outcome goal ongoing  -ST      UB Dressing OT LTG    UB Dressing Goal OT LTG, Time to Achieve 3 days  -ST      UB Dressing Goal OT LTG, [place] Level moderate assist (50% patient effort)  -ST      UB Dressing Goal OT LTG, Additional Goal pt/family to complete correct donning/doffing of sling and UB clothing by d/c   -ST      UB Dressing Goal OT LTG, Outcome goal ongoing  -ST        User Key  (r) = Recorded By, (t) = Taken By, (c) = Cosigned By    Initials Name Provider Type    EDUARDO García Occupational Therapist                Outcome Measures       12/12/17 0710          How much help from another is currently needed...    Putting on and taking off regular lower body clothing? 1  -ST      Bathing (including washing, rinsing, and drying) 1  -ST      Toileting (which includes using toilet bed pan or urinal) 2  -ST      Putting on and taking off regular upper body clothing 2  -ST      Taking care of personal grooming (such as brushing teeth) 3  -ST      Eating meals 3  -ST      Score 12  -ST      Functional Assessment    Outcome Measure Options AM-PAC 6 Clicks Daily Activity (OT)  -ST        User Key  (r) = Recorded By, (t) = Taken By, (c) = Cosigned By    Initials Name Provider Type    EDUARDO García Occupational Therapist          Time Calculation:   OT Start Time: 0710    Therapy Charges for Today     Code Description Service  Date Service Provider Modifiers Qty    51309382891  OT THERAPEUTIC ACT EA 15 MIN 12/12/2017 Fatoumata Herrera OTR GO 2    [id number]  OT EVAL MOD COMPLEXITY 3 12/12/2017 EDUARDO Sosa GO 1               Fatoumata Herrera, OTR  12/12/2017

## 2017-12-12 NOTE — PROGRESS NOTES
IM progress note      Mary Perez  [id number]  1945     LOS: 1 day     Attending: Jonathan Edmond MD    Primary Care Provider: Basil Payan MD      Chief Complaint/Reason for visit:  No chief complaint on file.      Subjective   Some increase in her pain this am. No f/c/n/vom/sob.     Objective     Vital Signs  Blood pressure 127/61, pulse 75, temperature 98.5 °F (36.9 °C), temperature source Oral, resp. rate 16, SpO2 97 %, not currently breastfeeding.  Temp (24hrs), Av.5 °F (36.9 °C), Min:98.3 °F (36.8 °C), Max:98.6 °F (37 °C)      Intake/Output:    Intake/Output Summary (Last 24 hours) at 17 1352  Last data filed at 17 1300   Gross per 24 hour   Intake              480 ml   Output             1600 ml   Net            -1120 ml         Physical Exam:     General Appearance:    Alert, cooperative, in no acute distress   Head:    Normocephalic, without obvious abnormality, atraumatic    Lungs:     Normal effort, symmetric chest rise, no crepitus, clear to      auscultation bilaterally                  Heart:    Regular rhythm and normal rate, normal S1 and S2, no            murmur, no gallop, no rub, no click   Abdomen:     Normal bowel sounds, no masses, no organomegaly, soft        non-tender, non-distended, no guarding, no rebound                tenderness   Extremities:   No clubbing, cyanosis or edema.  No deformities.    Pulses:   Pulses palpable and equal bilaterally   Skin:   No bleeding, bruising or rash   Neurologic:   Moves all extremities with no obvious focal motor deficit.  Cranial nerves 2 - 12 grossly intact     Results Review:     I reviewed the patient's new clinical results.     Results from last 7 days  Lab Units 17  0514   WBC 10*3/mm3 13.72*   HEMOGLOBIN g/dL 11.8   HEMATOCRIT % 37.8   PLATELETS 10*3/mm3 253   Results for MARY PEREZ (MRN [id number]) as of 2017 13:54   Ref. Range 2017 05:14 2017 08:05 2017 11:46   Glucose Latest  Ref Range: 70 - 130 mg/dL 110 (H) 157 (H) 135 (H)         Results from last 7 days  Lab Units 12/12/17  0514   SODIUM mmol/L 139   POTASSIUM mmol/L 4.5   CHLORIDE mmol/L 101   CO2 mmol/L 26.0   BUN mg/dL 12   CREATININE mg/dL 0.70   CALCIUM mg/dL 9.4   GLUCOSE mg/dL 110*     I reviewed the patient's new imaging including images and reports.    All medications reviewed.     aspirin 325 mg Oral Daily   FLUoxetine 40 mg Oral Daily   insulin lispro 0-7 Units Subcutaneous 4x Daily AC & at Bedtime   predniSONE 2.5 mg Oral Daily       dextrose 25 g Q15 Min PRN   dextrose 15 g Q15 Min PRN   diazePAM 5 mg Nightly PRN   docusate sodium 100 mg BID PRN   glucagon (human recombinant) 1 mg Q15 Min PRN   hydrALAZINE 10 mg Q6H PRN   HYDROmorphone 1 mg Q4H PRN   And     naloxone 0.1 mg Q5 Min PRN   nitroglycerin 0.4 mg Q5 Min PRN   ondansetron 4 mg Q6H PRN   Or     ondansetron 4 mg Q6H PRN   ondansetron 4 mg Q6H PRN   oxyCODONE-acetaminophen 1 tablet Q4H PRN   oxyCODONE-acetaminophen 2 tablet Q4H PRN   sodium chloride 500 mL TID PRN       Assessment/Plan     Principal Problem:    Status post reverse total replacement of right shoulder  Active Problems:    Arthropathy of shoulder region    Anxiety and depression    HLD (hyperlipidemia)    DM- new dx based on A1C      Plan  1. Rehab. OT.  2. Pain control-prns, interscalene block cath/care per anestheis/pain management   3. IS-encouraged  4. DVT proph-Mech/ASA.  5. Bowel regimen  6. DC planning. Expect discharge home tomorrow.     Anxiety and depression  - continue home valium and prozac     HLD  - resume statin when ok with Dr. Edmond     DM- new dx  - hgb A1c on 12/1/17 6.6  - Accuchecks ACHS with low dose SSI  - DM educator consult    Bossman Walker MD  12/12/17  1:52 PM

## 2017-12-12 NOTE — CONSULTS
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  Steroid induced    Length of Diabetes Diagnosis  Newly diagnosed <6 months [stated she knew she has pre and her PCP told her it is steroid induced plus her pain.]    Current DM knowledge  good [ at bedside and participated in the ed. He has diabetes and has had diabetes education. ]    Have you had diabetes education/teaching in the past?  no    Do you test your blood sugar at home?  yes    Frequency of checks  daily    Who performs the test?  self and     Have you had low blood sugar? (<70mg/dl)  no    Have you had high blood sugar? (>140mg/dl)  no    How would you rate your diabetes control?  good    Education Preferences     Nutrition Information     Assessment Topics     Healthy Eating - Assessment  Competent    Being Active - Assessment  Competent    Taking Medication - Assessment  N/A-unable to assess    Problem Solving - Assessment  Competent    Reducing Risk - Assessment  Competent    Healthy Coping - Assessment  Competent    Monitoring - Assessment  Competent    DM Goals                Most Recent Value    DM Education Needs     Meter  Has own    Meter Type  Other (comment) [Prodigy]    Frequency of Testing  Daily    Blood Glucose Target  -- [discussed and provided ADA recommended glucose goals and diagnostic criteria. Discussed need for g ood glucose control for wound healing]    Medication  -- [discussed why we have done SSI while here]    Problem Solving  Hypoglycemia, Hyperglycemia, Sick days, Signs, Symptoms, Treatment    Reducing Risks  A1C testing    Healthy Coping  Appropriate    Discharge Plan  Home    Motivation  Strong     Teaching Method  Explanation, Discussion, Handouts, Teach back    Patient Response  Verbalized understanding            Other Comments:  Patient was seen for new diagnosis of type 2 diabetes. Discussed and taught patient about type 2 diabetes self-management, risk factors, and importance of blood glucose control to reduce complications. Target blood glucose readings and A1c goals per ADA were reviewed. Reviewed with patient current A1c and discussed its significance. Signs, symptoms and treatment of hyperglycemia and hypoglycemia were discussed. She stated she has been told by her PCP that she is pre diabetes and right now has stress and steroid induced hyperglycemia. Lifestyle changes such as physical activity with MD approval and healthy eating were encouraged.  Patient was offered a glucose meter but has been using a Prodigy of her husbands at home already. Patient was encouraged to keep record of blood glucose readings to take to follow up appointment with PCP.  OP education was also encouraged for additional education once discharged.          Electronically signed by:  Lauren Linder RN  12/12/17 4:46 PM

## 2017-12-12 NOTE — THERAPY EVALUATION
Acute Care - Physical Therapy Initial Evaluation  [facility]     Patient Name: Henrietta Perez  : 1945  MRN: [id number]  Today's Date: 2017   Onset of Illness/Injury or Date of Surgery Date: 17  Date of Referral to PT: 17  Referring Physician: MD Mayito      Admit Date: 2017     Visit Dx:    ICD-10-CM ICD-9-CM   1. Impaired mobility and ADLs Z74.09 799.89     Patient Active Problem List   Diagnosis   • Arthropathy of shoulder region   • Status post reverse total replacement of right shoulder   • Anxiety and depression   • HLD (hyperlipidemia)   • DM- new dx based on A1C     Past Medical History:   Diagnosis Date   • Anxiety    • Arthritis    • Depression    • Fibromyalgia    • History of blood clotting disorder     PT IS A POOR HISTORIAN, STATED THAT A DERMATOLOGIST DX. HER WITH THIS, NOT SURE WHAT IT IS CALLED THOUGH.     • Tremor    • Wears glasses      Past Surgical History:   Procedure Laterality Date   • HYSTERECTOMY     • KNEE SURGERY     • ROTATOR CUFF REPAIR Right    • SKIN BIOPSY     • TOTAL SHOULDER ARTHROPLASTY W/ DISTAL CLAVICLE EXCISION Right 2017    Procedure: TOTAL SHOULDER REVERSE ARTHROPLASTY RIGHT ;  Surgeon: Jonathan Edmond MD;  Location: [facility];  Service:    • WISDOM TOOTH EXTRACTION            PT ASSESSMENT (last 72 hours)      PT Evaluation       17 1522 17 1253    Rehab Evaluation    Document Type  evaluation  -    Subjective Information  agree to therapy;no complaints  -EH    Patient Effort, Rehab Treatment  good  -EH    Symptoms Noted During/After Treatment  none  -EH    General Information    Patient Profile Review  yes  -EH    Onset of Illness/Injury or Date of Surgery Date  17  -    Referring Physician  MD Mayito  -    General Observations  Pt supine in bed with  in room.  positions sling prior to mobility.  -EH    Pertinent History Of Current Problem  Pt admitted for management long-standing R shoulder  pain and dysfunction. Pt s/p R shoulder RCT and now s/p R shoulder reverse TSA.  -    Precautions/Limitations  fall precautions;other (see comments)   On-Q, dizziness, sling, ROM per MD protocol.  -EH    Prior Level of Function  independent:;all household mobility  -EH    Equipment Currently Used at Home none  -SK cane, straight   was not using PTA  -    Plans/Goals Discussed With  patient;agreed upon  -    Risks Reviewed  patient:;LOB;increased discomfort;dizziness  -    Benefits Reviewed  patient:;improve function;increase independence  -    Barriers to Rehab  none identified  -EH    Living Environment    Lives With spouse  -SK spouse  -    Living Arrangements house  -SK house  -EH    Home Accessibility bed and bath on same level;stairs to enter home  -SK bed and bath on same level;stairs to enter home  -    Number of Stairs to Enter Home 3  -SK 3  -EH    Stair Railings at Home none  -SK none  -    Type of Financial/Environmental Concern none  -SK     Transportation Available car;family or friend will provide  -SK     Clinical Impression    Date of Referral to PT  12/11/17  -    PT Diagnosis  decreased balance, decreased independence with mobility  -    Criteria for Skilled Therapeutic Interventions Met  yes;treatment indicated  -    Rehab Potential  good, to achieve stated therapy goals  -    Pain Assessment    Pain Assessment  Hoffman-Lord FACES  -    Hoffman-Baker FACES Pain Rating  4  -    Pain Type  Acute pain  -    Pain Location  Shoulder  -EH    Pain Orientation  Right  -    Pain Intervention(s)  Repositioned;Ambulation/increased activity  -    Response to Interventions  no change  -    Cognitive Assessment/Intervention    Current Cognitive/Communication Assessment  functional  -    Orientation Status  oriented x 4  -    Follows Commands/Answers Questions  100% of the time;able to follow single-step instructions  -    Personal Safety  WNL/WFL  -    Personal Safety  Interventions  fall prevention program maintained;gait belt;nonskid shoes/slippers when out of bed  -    ROM (Range of Motion)    General ROM Detail  WNL  -    MMT (Manual Muscle Testing)    General MMT Assessment Detail  functionally 4/5.  -    Bed Mobility, Assessment/Treatment    Bed Mobility, Assistive Device  bed rails;head of bed elevated  -    Bed Mob, Supine to Sit, [place]  conditional independence  -    Bed Mob, Sit to Supine, [place]  supervision required   bed flat  -    Transfer Assessment/Treatment    Transfers, Sit-Stand [place]  contact guard assist  -    Transfers, Stand-Sit [place]  contact guard assist  -    Gait Assessment/Treatment    Gait, [place] Level  contact guard assist  -    Gait, Assistive Device  other (see comments)   no UE support; gait belt  -    Gait, Distance (Feet)  150  -    Gait, Gait Pattern Analysis  swing-to gait  -    Gait, Gait Deviations  step length decreased;bilateral:  -    Gait, Comment  short step length BLEs. VC for forward gaze, increased step length with little improement  -    Stairs Assessment/Treatment    Number of Stairs  8   3+2+3  -    Stairs, Handrail Location  other (see comments)  -    Stairs, [place] Level  contact guard assist  -    Stairs, Assistive Device  other (see comments)  -    Stairs, Technique Used  step to step (ascending);step to step (descending)  -    Stairs, Comment  Pt performs initial 5 steps with CGA using HR. final three were preformed by  with cane with CGA.  -    Motor Skills/Interventions    Additional Documentation  Balance Skills Training (Group)  -    Balance Skills Training    Standing-Level of Assistance  Contact guard  -    Standing-Balance Activities  --   pt with 1 LOB backward while in standing  -    Therapy Exercises    Right Upper Extremity  --  -    Positioning and Restraints    Pre-Treatment Position  in bed  -    Post Treatment Position  bed   -    In Bed  supine;encouraged to call for assist;call light within reach;RUE elevated;with family/caregiver  -      12/12/17 1215 12/12/17 1127    Rehab Evaluation    Evaluation Not Performed  patient/family declined evaluation  -    General Information    Equipment Currently Used at Home none  -SK     Living Environment    Lives With --  -SK     Living Arrangements --  -SK     Home Accessibility --  -SK     Number of Stairs to Enter Home --  -SK     Stair Railings at Home --  -SK     Type of Financial/Environmental Concern --  -SK     Transportation Available --  -SK       12/12/17 0710 12/11/17 2000    Rehab Evaluation    Document Type evaluation;therapy note (daily note)  -ST     Subjective Information no complaints;agree to therapy  -ST     Patient Effort, Rehab Treatment good  -ST     Symptoms Noted During/After Treatment increased pain;other (see comments)   nausea  -ST     Symptoms Noted Comment RN notified and brought meds  -     General Information    Patient Profile Review yes  -ST     Onset of Illness/Injury or Date of Surgery Date 12/11/17  -ST     Referring Physician MD Mayito  -ST     General Observations pt supine upon arrival;  present; on-Q ball and IV intact  -ST     Pertinent History Of Current Problem Pt admitted for sx management of long-standing R shoulder pain and dysfunction that failed to improve w/conservative measures and impacted QOL and daily tasks such and dressing and bathing. Pt with R shoulder RCT and now s/p R shoulder reverse TSA.   -ST     Precautions/Limitations fall precautions;other (see comments)   on-Q, dizziness, sling, ROM per MD protocol   -ST     Prior Level of Function --   difficulty with dressing and bathing d/t pain and dec. ROM  -ST     Equipment Currently Used at Home cane, straight   was not using PTA   -ST     Plans/Goals Discussed With patient;agreed upon  -ST     Risks Reviewed patient:;spouse/S.O.:;LOB;nausea/vomiting;dizziness;increased  discomfort;change in vital signs  -ST     Benefits Reviewed patient:;spouse/S.O.:;improve function;increase independence;increase balance;decrease pain;increase knowledge  -ST     Barriers to Rehab none identified  -ST     Living Environment    Lives With spouse  -ST     Living Arrangements house  -ST     Home Accessibility bed and bath on same level;stairs to enter home  -ST     Number of Stairs to Enter Home 3  -ST     Stair Railings at Home none  -ST     Living Environment Comment walk-in shower;  24/7   -ST     Pain Assessment    Pain Assessment 0-10  -ST     Pain Score 3  -ST     Post Pain Score 7  -ST     Pain Type Acute pain  -ST     Pain Location Shoulder  -ST     Pain Orientation Right  -ST     Pain Intervention(s) Repositioned  -ST     Cognitive Assessment/Intervention    Current Cognitive/Communication Assessment functional  -ST     Orientation Status oriented x 4  -ST     Follows Commands/Answers Questions 100% of the time  -ST     Personal Safety WNL/WFL  -ST     Personal Safety Interventions fall prevention program maintained;gait belt;nonskid shoes/slippers when out of bed  -ST     ROM (Range of Motion)    General ROM Detail R shoulder not formally tested d/t sx precautions, L UE ROM WFL for ADL performance   -ST     MMT (Manual Muscle Testing)    General MMT Assessment Detail R shoulder not formally tested d/t sx precautions, L UE ROM WFL for ADL performance   -ST     Muscle Tone Assessment    Muscle Tone Assessment  LUE;RUE  -    LUE Muscle Tone Assessment  severely decreased tone  -    RUE Muscle Tone Assessment  associated movements noted  -    Bed Mobility, Assessment/Treatment    Bed Mobility, Assistive Device bed rails;head of bed elevated  -     Bed Mobility, Scoot/Bridge, [place] conditional independence  -     Bed Mob, Supine to Sit, [place] set up required  -     Transfer Assessment/Treatment    Transfer, Comment deferred dizziness and nausea after sitting EOB  for dressing education and donning/doffing sling practice with    -ST     Therapy Exercises    Right Upper Extremity PROM:;AROM:;10 reps   AROM digit, wrist, elbow F/E, pronation/supination, pendulum  -ST     RUE Resistance --   exercises completed EOB per MD protocol   -ST     Sensory Assessment/Intervention    Light Touch RUE  -ST     RUE Light Touch mild impairment   d/t nerve cath  -ST     General Interventions    Range Of Motion completed HEP per MD protocl   -ST     Positioning and Restraints    Pre-Treatment Position in bed  -ST     Post Treatment Position bed  -ST     In Bed supine;call light within reach;encouraged to call for assist;with family/caregiver;with nsg;RUE elevated  -ST       12/11/17 1300       General Information    Equipment Currently Used at Home none  -LORNA       User Key  (r) = Recorded By, (t) = Taken By, (c) = Cosigned By    Initials Name Provider Type     Elba Golden, PT Physical Therapist     Fatoumata Herrera, OTR Occupational Therapist    SK Sonja C Kellerman, RN Case Manager    LORNA Nuria Bajwa, RN Registered Nurse     Steph Luis, RN Registered Nurse          Physical Therapy Education     Title: PT OT SLP Therapies (Active)     Topic: Physical Therapy (Active)     Point: Mobility training (Done)    Learning Progress Summary    Learner Readiness Method Response Comment Documented by Status   Patient Acceptance E VU,NR   12/12/17 1533 Done   Significant Other Acceptance E VU,NR   12/12/17 1533 Done               Point: Body mechanics (Done)    Learning Progress Summary    Learner Readiness Method Response Comment Documented by Status   Patient Acceptance E VU,NR   12/12/17 1533 Done   Significant Other Acceptance E VU,NR   12/12/17 1533 Done               Point: Precautions (Done)    Learning Progress Summary    Learner Readiness Method Response Comment Documented by Status   Patient Acceptance E VU,NR   12/12/17 1533 Done   Significant Other  Acceptance E VU,NR   12/12/17 1533 Done                      User Key     Initials Effective Dates Name Provider Type Discipline     06/19/15 -  Elba Golden, PT Physical Therapist PT                PT Recommendation and Plan  Anticipated Discharge Disposition: home with assist  PT Frequency: daily  Plan of Care Review  Plan Of Care Reviewed With: patient  Progress: improving  Outcome Summary/Follow up Plan: Pt ambulates in [place] 150 ft with CGA, 8 steps total. Pt with altered gait pattern and 1 LOB in standing. PT will continue during stay. Expected to need 24 hour assist at home.          IP PT Goals       12/12/17 1538          Bed Mobility PT LTG    Bed Mobility PT LTG, Date Established 12/12/17  -      Bed Mobility PT LTG, Time to Achieve 2 wks  -EH      Bed Mobility PT LTG, Activity Type supine to sit/sit to supine  -      Bed Mobility PT LTG, [place] Level contact guard assist  -      Transfer Training PT LTG    Transfer Training PT LTG, Date Established 12/12/17  -      Transfer Training PT LTG, Time to Achieve 2 - 3 days  -      Transfer Training PT LTG, Activity Type sit to stand/stand to sit  -      Transfer Training PT LTG, [place] Level independent  -EH      Gait Training PT LTG    Gait Training Goal PT LTG, Date Established 12/12/17  -      Gait Training Goal PT LTG, Time to Achieve 2 - 3 days  -      Gait Training Goal PT LTG, [place] Level independent  -EH      Gait Training Goal PT LTG, Distance to Achieve 150  -EH      Stair Training PT LTG    Stair Training Goal PT LTG, Date Established 12/12/17  -      Stair Training Goal PT LTG, Time to Achieve 2 - 3 days  -      Stair Training Goal PT LTG, Number of Steps 3  -EH      Stair Training Goal PT LTG, [place] Level supervision required  -      Stair Training Goal PT LTG, Assist Device cane, straight  -        User Key  (r) = Recorded By, (t) = Taken By, (c) = Cosigned By    Initials Name Provider  Type     Elba Golden, PT Physical Therapist                Outcome Measures       12/12/17 1453 12/12/17 0710       How much help from another person do you currently need...    Turning from your back to your side while in flat bed without using bedrails? 3  -EH      Moving from lying on back to sitting on the side of a flat bed without bedrails? 3  -EH      Moving to and from a bed to a chair (including a wheelchair)? 3  -EH      Standing up from a chair using your arms (e.g., wheelchair, bedside chair)? 3  -EH      Climbing 3-5 steps with a railing? 3  -EH      To walk in hospital room? 3  -EH      AM-PAC 6 Clicks Score 18  -EH      How much help from another is currently needed...    Putting on and taking off regular lower body clothing?  1  -ST     Bathing (including washing, rinsing, and drying)  1  -ST     Toileting (which includes using toilet bed pan or urinal)  2  -ST     Putting on and taking off regular upper body clothing  2  -ST     Taking care of personal grooming (such as brushing teeth)  3  -ST     Eating meals  3  -ST     Score  12  -ST     Functional Assessment    Outcome Measure Options AM-PAC 6 Clicks Basic Mobility (PT)  - AM-PAC 6 Clicks Daily Activity (OT)  -ST       User Key  (r) = Recorded By, (t) = Taken By, (c) = Cosigned By    Initials Name Provider Type     Elba Golden, PT Physical Therapist    ST Fatoumata Herrera, OTR Occupational Therapist           Time Calculation:         PT Charges       12/12/17 1536          Time Calculation    Start Time 1452  -      PT Received On 12/12/17  [facility]      PT Goal Re-Cert Due Date 12/22/17  -      Time Calculation- PT    Total Timed Code Minutes- PT 0 minute(s)  -        User Key  (r) = Recorded By, (t) = Taken By, (c) = Cosigned By    Initials Name Provider Type     Elba Golden, PT Physical Therapist          Therapy Charges for Today     Code Description Service Date Service Provider Modifiers Qty    [id number]   PT EVAL LOW COMPLEXITY 4 12/12/2017 Elba Golden, PT GP 1          PT G-Codes  Outcome Measure Options: AM-PAC 6 Clicks Basic Mobility (PT)      Elba Golden, PT  12/12/2017

## 2017-12-13 VITALS
HEART RATE: 72 BPM | SYSTOLIC BLOOD PRESSURE: 116 MMHG | OXYGEN SATURATION: 94 % | RESPIRATION RATE: 16 BRPM | DIASTOLIC BLOOD PRESSURE: 60 MMHG | TEMPERATURE: 99.1 F

## 2017-12-13 LAB
GLUCOSE BLDC GLUCOMTR-MCNC: 114 MG/DL (ref 70–130)
GLUCOSE BLDC GLUCOMTR-MCNC: 142 MG/DL (ref 70–130)

## 2017-12-13 PROCEDURE — 82962 GLUCOSE BLOOD TEST: CPT

## 2017-12-13 PROCEDURE — 25010000002 HYDROMORPHONE PER 4 MG: Performed by: ORTHOPAEDIC SURGERY

## 2017-12-13 PROCEDURE — 63710000001 PREDNISONE PER 5 MG: Performed by: ORTHOPAEDIC SURGERY

## 2017-12-13 PROCEDURE — 97116 GAIT TRAINING THERAPY: CPT

## 2017-12-13 PROCEDURE — 97530 THERAPEUTIC ACTIVITIES: CPT | Performed by: OCCUPATIONAL THERAPIST

## 2017-12-13 RX ORDER — PSEUDOEPHEDRINE HCL 30 MG
100 TABLET ORAL 2 TIMES DAILY PRN
Start: 2017-12-13

## 2017-12-13 RX ORDER — OXYCODONE AND ACETAMINOPHEN 7.5; 325 MG/1; MG/1
1 TABLET ORAL EVERY 4 HOURS PRN
Start: 2017-12-13 | End: 2017-12-22

## 2017-12-13 RX ORDER — ROPIVACAINE IN 0.9% SOD CHL/PF 0.2% 545ML
6 ELASTOMERIC PUMP, HI VARIABLE RATE INJECTION CONTINUOUS
Start: 2017-12-13 | End: 2017-12-13 | Stop reason: HOSPADM

## 2017-12-13 RX ADMIN — OXYCODONE HYDROCHLORIDE AND ACETAMINOPHEN 1 TABLET: 7.5; 325 TABLET ORAL at 09:26

## 2017-12-13 RX ADMIN — PREDNISONE 2.5 MG: 5 TABLET ORAL at 09:26

## 2017-12-13 RX ADMIN — OXYCODONE HYDROCHLORIDE AND ACETAMINOPHEN 1 TABLET: 7.5; 325 TABLET ORAL at 00:26

## 2017-12-13 RX ADMIN — FLUOXETINE HYDROCHLORIDE 40 MG: 20 CAPSULE ORAL at 09:25

## 2017-12-13 RX ADMIN — ASPIRIN 325 MG: 325 TABLET, DELAYED RELEASE ORAL at 09:25

## 2017-12-13 RX ADMIN — OXYCODONE HYDROCHLORIDE AND ACETAMINOPHEN 1 TABLET: 7.5; 325 TABLET ORAL at 05:47

## 2017-12-13 RX ADMIN — HYDROMORPHONE HYDROCHLORIDE 1 MG: 2 INJECTION INTRAMUSCULAR; INTRAVENOUS; SUBCUTANEOUS at 00:25

## 2017-12-13 NOTE — ADDENDUM NOTE
Addendum  created 12/13/17 1320 by Vern Middleton MD    Anesthesia Intra Blocks edited, Child order released for a procedure order, LDA created via procedure documentation, Sign clinical note

## 2017-12-13 NOTE — THERAPY DISCHARGE NOTE
Acute Care - Occupational Therapy Treatment Note/Discharge   [place]     Patient Name: Henrietta Perez  : 1945  MRN: [id number]  Today's Date: 2017  Onset of Illness/Injury or Date of Surgery Date: 17  Date of Referral to OT: 17  Referring Physician: MD Mayito      Admit Date: 2017    Visit Dx:     ICD-10-CM ICD-9-CM   1. Impaired mobility and ADLs Z74.09 799.89     Patient Active Problem List   Diagnosis   • Arthropathy of shoulder region   • Status post reverse total replacement of right shoulder   • Anxiety and depression   • HLD (hyperlipidemia)   • DM- new dx based on A1C             Adult Rehabilitation Note       17 0950 17 0845 17 1253    Rehab Assessment/Intervention    Discipline physical therapy assistant  -AS occupational therapist  -ST physical therapist  -    Document Type therapy note (daily note)  -AS therapy note (daily note);discharge summary  -ST     Subjective Information agree to therapy;no complaints  -AS no complaints;agree to therapy  -ST     Patient Effort, Rehab Treatment  good  -ST     Symptoms Noted During/After Treatment none  -AS increased pain  -ST     Symptoms Noted Comment  RN aware, going for re-block later in morning  -ST     Precautions/Limitations fall precautions;brace on when up;other (see comments)   Sling, ROM per MD protocol  -AS other (see comments);fall precautions   sling, shoulder/ROM precautions per MD  -ST     Patient Response to Treatment  nerve cath removed, pt to be re-blocked prior to d/c.   -ST     Recorded by [AS] Jeny Schuler PTA [ST] Fatoumata Herrera OTR [EH] Elba Golden, PT    Pain Assessment    Pain Assessment [place] FACES  -AS 0-10  -ST     Pain Score 2  -AS 3  -ST     Post Pain Score 0  -AS 5  -ST     Pain Type  Acute pain  -ST     Pain Location  Shoulder  -ST     Pain Orientation  Right  -ST     Pain Intervention(s)  Repositioned  -ST     Recorded by [AS] Jeny Schuler PTA  [ST] Fatoumata Herrera, OTR     Cognitive Assessment/Intervention    Current Cognitive/Communication Assessment functional  -AS functional  -ST     Orientation Status oriented x 4  -AS oriented x 4  -ST     Follows Commands/Answers Questions 100% of the time;able to follow single-step instructions  -% of the time  -ST     Personal Safety WNL/WFL  -AS WNL/WFL  -ST     Personal Safety Interventions gait belt;nonskid shoes/slippers when out of bed  -AS fall prevention program maintained;gait belt;nonskid shoes/slippers when out of bed  -ST     Recorded by [AS] Jeny Schuler PTA [ST] Fatoumata Herrera, OTR     Bed Mobility, Assessment/Treatment    Bed Mobility, Comment UIC  -AS UIC  -ST     Recorded by [AS] Jeny Schuler PTA [ST] Fatoumata Herrera, OTR     Transfer Assessment/Treatment    Transfers, Sit-Stand [place] verbal cues required;stand by assist  -AS supervision required   to adjust sling strap  -ST     Transfers, Stand-Sit [place] verbal cues required;stand by assist  -AS supervision required  -ST     Transfers, Sit-Stand-Sit, Assist Device other (see comments)   No AD, gait belt only  -AS      Transfer, Comment good technique using left UE to push up from chair  -AS      Recorded by [AS] Jeny Schuler PTA [ST] Fatoumata Herrera, OTR     Gait Assessment/Treatment    Gait, [place] Level contact guard assist  -AS      Gait, Assistive Device other (see comments)   gait belt  -AS      Gait, Distance (Feet) 350  -AS      Gait, Gait Deviations nader decreased;step length decreased  -AS      Gait, Safety Issues weight-shifting ability decreased;step length decreased  -AS      Gait, Impairments strength decreased  -AS      Gait, Comment good slow pace, no LOB noticedf  -AS      Recorded by [AS] Jeny Schuler PTA      Stairs Assessment/Treatment    Number of Stairs 8  -AS      Stairs, [place] Level contact guard assist;verbal cues required  -AS      Stairs, Technique Used  step to step (ascending);step to step (descending)  -AS      Stairs, Safety Issues weight-shifting ability decreased  -AS      Stairs, Impairments strength decreased  -AS      Stairs, Comment verbal cues to have spouse support LLE up in house  -AS      Recorded by [AS] Jeny Schuler, PTA      Upper Body Dressing Assessment/Training    UB Dressing Assess/Train, Clothing Type  doffing:;donning:   sling  -ST     UB Dressing Assess/Train, Assist Device  teresa technique  -ST     UB Dressing Assess/Train, Position  sitting  -ST     UB Dressing Assess/Train, [place]  moderate assist (50% patient effort)   reviewed donning/doffing UB clothing techniques   -ST     UB Dressing Assess/Train, Impairments  ROM decreased;pain   shoulder ROM precautions  -ST     UB Dressing Assess/Train, Comment  pt able to verbalize strap locations correctly and how to don/doff sling however unable to assist much with action of donning/doffing; pt's  with appropriate teach back for carryover at home   -ST     Recorded by  [ST] Fatoumata Herrera, HARLEENR     Therapy Exercises    Right Upper Extremity  AAROM:;AROM:;10 reps   digt, wrist, elbow F/E, pronation/supination,   -ST     RUE Resistance  --   pendulum exercises per MD protocol-completed in sitting   -ST     Left Upper Extremity  --   for safety d/t dizziness previous session when sitting EOB  -ST     Recorded by  [ST] Fatoumata Herrera, HARLEENR     Sensory Assessment/Intervention    RUE Light Touch  WNL   sensation has fully returned  -ST     Recorded by  [ST] Fatoumata Herrera, HARLEENR     Positioning and Restraints    Pre-Treatment Position sitting in chair/recliner  -AS sitting in chair/recliner  -ST     Post Treatment Position chair  -AS chair  -ST     In Chair reclined;call light within reach;encouraged to call for assist;with family/caregiver;with brace  -AS notified nsg;reclined;call light within reach;encouraged to call for assist;with family/caregiver;RUE elevated  -ST      Recorded by [AS] Jeny Schuler, PTA [ST] Fatoumata Herrera, OTR       User Key  (r) = Recorded By, (t) = Taken By, (c) = Cosigned By    Initials Name Effective Dates    EH Elba Golden, PT 06/19/15 -     ST Fatoumata Herrera, OTR 02/20/17 -     AS Jeny Schuler, PTA 06/22/15 -                 OT Goals       12/13/17 1407 12/12/17 1445       Transfer Training OT LTG    Transfer Training OT LTG, Time to Achieve  3 days  -ST     Transfer Training OT LTG, Activity Type  toilet;bed to chair /chair to bed  -ST     Transfer Training OT LTG, [place] Level  supervision required  -ST     Transfer Training OT LTG, Outcome goal partially met  -ST goal ongoing  -ST     Range of Motion OT LTG    Range of Motion Goal OT LTG, Time to Achieve  3 days  -ST     Range of Motion Goal OT LTG, Additional Goal  Pt/family to complete correct completion of RUE ROM HEP per MD protocol by d/c   -ST     Range of Motion Goal OT LTG, Outcome goal met  -ST goal ongoing  -ST     UB Dressing OT LTG    UB Dressing Goal OT LTG, Time to Achieve  3 days  -ST     UB Dressing Goal OT LTG, [place] Level  moderate assist (50% patient effort)  -ST     UB Dressing Goal OT LTG, Additional Goal  pt/family to complete correct donning/doffing of sling and UB clothing by d/c   -ST     UB Dressing Goal OT LTG, Outcome goal met  -ST goal ongoing  -ST       User Key  (r) = Recorded By, (t) = Taken By, (c) = Cosigned By    Initials Name Provider Type    ST Fatoumatarangel Herrera, OTR Occupational Therapist          Occupational Therapy Education     Title: PT OT SLP Therapies (Active)     Topic: Occupational Therapy (Done)     Point: ADL training (Done)    Description: Instruct learner(s) on proper safety adaptation and remediation techniques during self care or transfers.   Instruct in proper use of assistive devices.    Learning Progress Summary    Learner Readiness Method Response Comment Documented by Status   Patient Acceptance E,TB,D SARAH ROBBINS  sling use, donning/doffing, axillary care, precautions, HEP ST 12/13/17 1407 Done    Acceptance E,TB,D,H VU,DU role of OT, benfits of activity, bed mobility, axillary care, shoulder precautions, sling use/donning/doffing, teresa dressing ST 12/12/17 1445 Done   Family Acceptance E,TB,D DU,VU sling use, donning/doffing, axillary care, precautions, HEP ST 12/13/17 1407 Done    Acceptance E,TB,D,H VU,DU role of OT, benfits of activity, bed mobility, axillary care, shoulder precautions, sling use/donning/doffing, teresa dressing ST 12/12/17 1445 Done               Point: Home exercise program (Done)    Description: Instruct learner(s) on appropriate technique for monitoring, assisting and/or progressing therapeutic exercises/activities.    Learning Progress Summary    Learner Readiness Method Response Comment Documented by Status   Patient Acceptance E,TB,D DU,VU sling use, donning/doffing, axillary care, precautions, HEP ST 12/13/17 1407 Done    Acceptance E,TB,D,H VU,DU role of OT, benfits of activity, bed mobility, axillary care, shoulder precautions, sling use/donning/doffing, teresa dressing ST 12/12/17 1445 Done   Family Acceptance E,TB,D DU,VU sling use, donning/doffing, axillary care, precautions, HEP ST 12/13/17 1407 Done    Acceptance E,TB,D,H VU,DU role of OT, benfits of activity, bed mobility, axillary care, shoulder precautions, sling use/donning/doffing, teresa dressing ST 12/12/17 1445 Done               Point: Precautions (Done)    Description: Instruct learner(s) on prescribed precautions during self-care and functional transfers.    Learning Progress Summary    Learner Readiness Method Response Comment Documented by Status   Patient Acceptance E,TB,D DU,VU sling use, donning/doffing, axillary care, precautions, HEP ST 12/13/17 1407 Done    Acceptance E,TB,D,H VU,DU role of OT, benfits of activity, bed mobility, axillary care, shoulder precautions, sling use/donning/doffing, teresa dressing ST 12/12/17 1445 Done    Family Acceptance E,TB,D DU,VU sling use, donning/doffing, axillary care, precautions, HEP ST 12/13/17 1407 Done    Acceptance E,TB,D,H VU,DU role of OT, benfits of activity, bed mobility, axillary care, shoulder precautions, sling use/donning/doffing, teresa dressing ST 12/12/17 1445 Done               Point: Body mechanics (Done)    Description: Instruct learner(s) on proper positioning and spine alignment during self-care, functional mobility activities and/or exercises.    Learning Progress Summary    Learner Readiness Method Response Comment Documented by Status   Patient Acceptance E,TB,D DU,VU sling use, donning/doffing, axillary care, precautions, HEP ST 12/13/17 1407 Done    Acceptance E,TB,D,H VU,DU role of OT, benfits of activity, bed mobility, axillary care, shoulder precautions, sling use/donning/doffing, teresa dressing ST 12/12/17 1445 Done   Family Acceptance E,TB,D DU,VU sling use, donning/doffing, axillary care, precautions, HEP ST 12/13/17 1407 Done    Acceptance E,TB,D,H VU,DU role of OT, benfits of activity, bed mobility, axillary care, shoulder precautions, sling use/donning/doffing, teresa dressing ST 12/12/17 1445 Done                      User Key     Initials Effective Dates Name Provider Type Discipline    ST 02/20/17 -  Fatoumata Herrera, OTR Occupational Therapist OT                OT Recommendation and Plan  Anticipated Equipment Needs At Discharge:  (TBD)  Anticipated Discharge Disposition: home with assist  Planned Therapy Interventions: ADL retraining, balance training, bed mobility training, home exercise program, ROM (Range of Motion), transfer training  Therapy Frequency: daily  Plan of Care Review  Plan Of Care Reviewed With: patient  Outcome Summary/Follow up Plan: Good progress with ROM HEP along with pain control despite nerve cath being pulled. Pt/family demonstrate good understanding of sling use/donning/doffing and safety with shoulder precautions. Expected to d/c home w/family  assist.           Outcome Measures       12/13/17 0950 12/13/17 0845 12/12/17 1453    How much help from another person do you currently need...    Turning from your back to your side while in flat bed without using bedrails? 3  -AS  3  -EH    Moving from lying on back to sitting on the side of a flat bed without bedrails? 3  -AS  3  -EH    Moving to and from a bed to a chair (including a wheelchair)? 4  -AS  3  -EH    Standing up from a chair using your arms (e.g., wheelchair, bedside chair)? 4  -AS  3  -EH    Climbing 3-5 steps with a railing? 3  -AS  3  -EH    To walk in hospital room? 3  -AS  3  -EH    AM-PAC 6 Clicks Score 20  -AS  18  -EH    How much help from another is currently needed...    Putting on and taking off regular lower body clothing?  1  -ST     Bathing (including washing, rinsing, and drying)  1  -ST     Toileting (which includes using toilet bed pan or urinal)  2  -ST     Putting on and taking off regular upper body clothing  2  -ST     Taking care of personal grooming (such as brushing teeth)  3  -ST     Eating meals  3  -ST     Score  12  -ST     Functional Assessment    Outcome Measure Options AM-PAC 6 Clicks Basic Mobility (PT)  -AS  -PAC 6 Clicks Basic Mobility (PT)  -      12/12/17 0710          How much help from another is currently needed...    Putting on and taking off regular lower body clothing? 1  -ST      Bathing (including washing, rinsing, and drying) 1  -ST      Toileting (which includes using toilet bed pan or urinal) 2  -ST      Putting on and taking off regular upper body clothing 2  -ST      Taking care of personal grooming (such as brushing teeth) 3  -ST      Eating meals 3  -ST      Score 12  -ST      Functional Assessment    Outcome Measure Options AM-PAC 6 Clicks Daily Activity (OT)  -ST        User Key  (r) = Recorded By, (t) = Taken By, (c) = Cosigned By    Initials Name Provider Type     Elba Golden, PT Physical Therapist    ST Fatoumata Herrera, OTR  Occupational Therapist    AS Jeny Schuler, PTA Physical Therapy Assistant           Time Calculation:          Time Calculation- OT       12/13/17 1410          Time Calculation- OT    OT Start Time 0845  -ST      Total Timed Code Minutes- OT 46 minute(s)  -ST      OT Received On 12/13/17  -ST      OT Goal Re-Cert Due Date 12/22/17  -ST        User Key  (r) = Recorded By, (t) = Taken By, (c) = Cosigned By    Initials Name Provider Type     EDUARDO Sosa Occupational Therapist          Therapy Charges for Today     Code Description Service Date Service Provider Modifiers Qty    70867496593 HC OT THERAPEUTIC ACT EA 15 MIN 12/12/2017 Fatoumata Herrera OTR GO 2    09497041016 HC OT EVAL MOD COMPLEXITY 3 12/12/2017 Fatoumata Herrera OTR GO 1    68978098323 HC OT THERAPEUTIC ACT EA 15 MIN 12/13/2017 Fatoumata Herrera OTR GO 3               OT Discharge Summary  Anticipated Discharge Disposition: home with assist  Reason for Discharge: Discharge from facility  Outcomes Achieved: Refer to plan of care for updates on goals achieved  Discharge Destination: Home with assist    EDUARDO Espinoza  12/13/2017

## 2017-12-13 NOTE — ANESTHESIA PROCEDURE NOTES
Peripheral Block    Patient location during procedure: pre-op  Start time: 12/13/2017 1:10 PM  Stop time: 12/13/2017 1:15 PM  Reason for block: post-op pain management  Performed by  Anesthesiologist: MATEO LAROSE  Preanesthetic Checklist  Completed: patient identified, site marked, surgical consent, pre-op evaluation, timeout performed, IV checked, risks and benefits discussed and monitors and equipment checked  Prep:  Pt Position: sitting  Sterile barriers:cap, gloves, mask and sterile barriers  Prep: ChloraPrep  Patient monitoring: blood pressure monitoring, continuous pulse oximetry and EKG  Procedure  Sedation:yes    Guidance:ultrasound guided  Images:still images obtained    Laterality:right  Block Type:interscalene  Injection Technique:catheter  Needle Type:Tuohy and echogenic  Needle Gauge:18 G    Catheter Size:20 G (20g)  Medications  Local Injected:bupivacaine 0.25% Local Amount Injected:21mL  Post Assessment  Injection Assessment: negative aspiration for heme, no paresthesia on injection and incremental injection  Patient Tolerance:comfortable throughout block  Complications:no  Additional Notes  Procedure:                 The pt was placed in semifowlers position with a slight tilt of the thorax contralateral to the insertion site.  The Insertion Site was prepped and draped in sterile fashion.   Skin and cutaneous tissue was infiltrated and anesthetized with 1% Lidocaine 3 mls via a 25g needle.  Utilizing ultrasound guidance, a BBraun 2 inch 18 g Contiplex echogenic touhy needle was advanced in-plane.  Hydro dissection of tissue was achieved with Normal saline. Major vessels(carotid and Internal Jugular) where visualized as the brachial plexus was approached at the approximate level of C-7/ T-1.  Cervical 5 and Branches of Cervical 6 nerve roots where visualized and the needle tip was placed posterior at the level of C-6 roots.  LA spread was visualized and injection was made incrementally every  5 mls with aspiration. Injection pressure was normal or little, there was no intraneural injection, no vascular injection.      Thank You.

## 2017-12-13 NOTE — THERAPY TREATMENT NOTE
Acute Care - Physical Therapy Treatment Note  [facility]     Patient Name: Henrietta Perez  : 1945  MRN: [id number]  Today's Date: 2017  Onset of Illness/Injury or Date of Surgery Date: 17  Date of Referral to PT: 17  Referring Physician: MD Mayito    Admit Date: 2017    Visit Dx:    ICD-10-CM ICD-9-CM   1. Impaired mobility and ADLs Z74.09 799.89     Patient Active Problem List   Diagnosis   • Arthropathy of shoulder region   • Status post reverse total replacement of right shoulder   • Anxiety and depression   • HLD (hyperlipidemia)   • DM- new dx based on A1C               Adult Rehabilitation Note       17 0950 17 1253       Rehab Assessment/Intervention    Discipline physical therapy assistant  -AS physical therapist  -     Document Type therapy note (daily note)  -AS      Subjective Information agree to therapy;no complaints  -AS      Symptoms Noted During/After Treatment none  -AS      Precautions/Limitations fall precautions;brace on when up;other (see comments)   Sling, ROM per MD protocol  -AS      Recorded by [AS] Jeny Schuler PTA [EH] Elba Golden, PT     Pain Assessment    Pain Assessment [place] FACES  -AS      Pain Score 2  -AS      Post Pain Score 0  -AS      Recorded by [AS] Jeny Schuler PTA      Cognitive Assessment/Intervention    Current Cognitive/Communication Assessment functional  -AS      Orientation Status oriented x 4  -AS      Follows Commands/Answers Questions 100% of the time;able to follow single-step instructions  -AS      Personal Safety WNL/WFL  -AS      Personal Safety Interventions gait belt;nonskid shoes/slippers when out of bed  -AS      Recorded by [AS] Jeny Schuler PTA      Bed Mobility, Assessment/Treatment    Bed Mobility, Comment UIC  -AS      Recorded by [AS] Jeny Schuler PTA      Transfer Assessment/Treatment    Transfers, Sit-Stand [place] verbal cues required;stand by assist  -AS       Transfers, Stand-Sit [place] verbal cues required;stand by assist  -AS      Transfers, Sit-Stand-Sit, Assist Device other (see comments)   No AD, gait belt only  -AS      Transfer, Comment good technique using left UE to push up from chair  -AS      Recorded by [AS] Jeny Schuler PTA      Gait Assessment/Treatment    Gait, [place] Level contact guard assist  -AS      Gait, Assistive Device other (see comments)   gait belt  -AS      Gait, Distance (Feet) 350  -AS      Gait, Gait Deviations nader decreased;step length decreased  -AS      Gait, Safety Issues weight-shifting ability decreased;step length decreased  -AS      Gait, Impairments strength decreased  -AS      Gait, Comment good slow pace, no LOB noticedf  -AS      Recorded by [AS] Jeny Schuler PTA      Stairs Assessment/Treatment    Number of Stairs 8  -AS      Stairs, [place] Level contact guard assist;verbal cues required  -AS      Stairs, Technique Used step to step (ascending);step to step (descending)  -AS      Stairs, Safety Issues weight-shifting ability decreased  -AS      Stairs, Impairments strength decreased  -AS      Stairs, Comment verbal cues to have spouse support LLE up in house  -AS      Recorded by [AS] Jeny Schuler PTA      Positioning and Restraints    Pre-Treatment Position sitting in chair/recliner  -AS      Post Treatment Position chair  -AS      In Chair reclined;call light within reach;encouraged to call for assist;with family/caregiver;with brace  -AS      Recorded by [AS] Jeny Schuler PTA        User Key  (r) = Recorded By, (t) = Taken By, (c) = Cosigned By    Initials Name Effective Dates     Elba Golden, PT 06/19/15 -     AS Jeny Schuler PTA 06/22/15 -                 IP PT Goals       12/13/17 1024 12/12/17 1538       Bed Mobility PT LTG    Bed Mobility PT LTG, Date Established  12/12/17  -EH     Bed Mobility PT LTG, Time to Achieve  2 wks  -     Bed Mobility PT  LTG, Activity Type  supine to sit/sit to supine  -EH     Bed Mobility PT LTG, [place] Level  contact guard assist  -EH     Bed Mobility PT LTG, Date Goal Reviewed 12/13/17  -AS      Bed Mobility PT LTG, Outcome goal ongoing  -AS      Transfer Training PT LTG    Transfer Training PT LTG, Date Established  12/12/17  -     Transfer Training PT LTG, Time to Achieve  2 - 3 days  -EH     Transfer Training PT LTG, Activity Type  sit to stand/stand to sit  -EH     Transfer Training PT LTG, [place] Level  independent  -EH     Transfer Training PT  LTG, Date Goal Reviewed 12/13/17  -AS      Transfer Training PT LTG, Outcome goal ongoing  -AS      Gait Training PT LTG    Gait Training Goal PT LTG, Date Established  12/12/17  -     Gait Training Goal PT LTG, Time to Achieve  2 - 3 days  -EH     Gait Training Goal PT LTG, [place] Level  independent  -EH     Gait Training Goal PT LTG, Distance to Achieve  150  -EH     Gait Training Goal PT LTG, Date Goal Reviewed 12/13/17  -AS      Gait Training Goal PT LTG, Outcome goal ongoing  -AS      Stair Training PT LTG    Stair Training Goal PT LTG, Date Established  12/12/17  -     Stair Training Goal PT LTG, Time to Achieve  2 - 3 days  -EH     Stair Training Goal PT LTG, Number of Steps  3  -     Stair Training Goal PT LTG, [place] Level  supervision required  -     Stair Training Goal PT LTG, Assist Device  cane, straight  -     Stair Training Goal PT LTG, Date Goal Reviewed 12/13/17  -AS      Stair Training Goal PT LTG, Outcome goal ongoing  -AS        User Key  (r) = Recorded By, (t) = Taken By, (c) = Cosigned By    Initials Name Provider Type     Elba Golden, PT Physical Therapist    AS Jeny Schuler, PTA Physical Therapy Assistant          Physical Therapy Education     Title: PT OT SLP Therapies (Active)     Topic: Physical Therapy (Active)     Point: Mobility training (Active)    Learning Progress Summary    Learner Readiness Method  Response Comment Documented by Status   Patient Acceptance E NR  AS 12/13/17 1024 Active    Acceptance E VU,NR  EH 12/12/17 1533 Done   Significant Other Acceptance E VU,NR  EH 12/12/17 1533 Done               Point: Home exercise program (Active)    Learning Progress Summary    Learner Readiness Method Response Comment Documented by Status   Patient Acceptance E NR  AS 12/13/17 1024 Active               Point: Body mechanics (Active)    Learning Progress Summary    Learner Readiness Method Response Comment Documented by Status   Patient Acceptance E NR  AS 12/13/17 1024 Active    Acceptance E VU,NR  EH 12/12/17 1533 Done   Significant Other Acceptance E VU,NR  EH 12/12/17 1533 Done               Point: Precautions (Active)    Learning Progress Summary    Learner Readiness Method Response Comment Documented by Status   Patient Acceptance E NR  AS 12/13/17 1024 Active    Acceptance E VU,NR  EH 12/12/17 1533 Done   Significant Other Acceptance E VU,NR  EH 12/12/17 1533 Done                      User Key     Initials Effective Dates Name Provider Type Discipline     06/19/15 -  Elba Golden, PT Physical Therapist PT    AS 06/22/15 -  Jeny Schuler, PTA Physical Therapy Assistant PT                    PT Recommendation and Plan  Anticipated Discharge Disposition: home with assist  PT Frequency: daily  Plan of Care Review  Plan Of Care Reviewed With: patient  Progress: improving  Outcome Summary/Follow up Plan: patient doing well with mobility, no AD needed for gait. Stair training provided without dififculty or concerns.          Outcome Measures       12/13/17 0950 12/12/17 1453 12/12/17 0710    How much help from another person do you currently need...    Turning from your back to your side while in flat bed without using bedrails? 3  -AS 3  -EH     Moving from lying on back to sitting on the side of a flat bed without bedrails? 3  -AS 3  -EH     Moving to and from a bed to a chair (including a  wheelchair)? 4  -AS 3  -EH     Standing up from a chair using your arms (e.g., wheelchair, bedside chair)? 4  -AS 3  -EH     Climbing 3-5 steps with a railing? 3  -AS 3  -EH     To walk in hospital room? 3  -AS 3  -EH     AM-PAC 6 Clicks Score 20  -AS 18  -EH     How much help from another is currently needed...    Putting on and taking off regular lower body clothing?   1  -ST    Bathing (including washing, rinsing, and drying)   1  -ST    Toileting (which includes using toilet bed pan or urinal)   2  -ST    Putting on and taking off regular upper body clothing   2  -ST    Taking care of personal grooming (such as brushing teeth)   3  -ST    Eating meals   3  -ST    Score   12  -ST    Functional Assessment    Outcome Measure Options AM-PAC 6 Clicks Basic Mobility (PT)  -AS AM-PAC 6 Clicks Basic Mobility (PT)  -EH AM-PAC 6 Clicks Daily Activity (OT)  -ST      User Key  (r) = Recorded By, (t) = Taken By, (c) = Cosigned By    Initials Name Provider Type     Elba Golden, PT Physical Therapist     Fatoumata Herrera, OTR Occupational Therapist    AS Jeny Schuler PTA Physical Therapy Assistant           Time Calculation:         PT Charges       12/13/17 1027          Time Calculation    Start Time 0950  -AS      PT Received On 12/13/17  -AS      PT Goal Re-Cert Due Date 12/22/17  -AS      Time Calculation- PT    Total Timed Code Minutes- PT 23 minute(s)  -AS        User Key  (r) = Recorded By, (t) = Taken By, (c) = Cosigned By    Initials Name Provider Type    AS Jeny Schuler PTA Physical Therapy Assistant          Therapy Charges for Today     Code Description Service Date Service Provider Modifiers Qty    49532442250 HC GAIT TRAINING EA 15 MIN 12/13/2017 Jeny Schuler PTA GP 2    88322449508 HC PT THER SUPP EA 15 MIN 12/13/2017 Jeny Schuler PTA GP 2          PT G-Codes  Outcome Measure Options: AM-PAC 6 Clicks Basic Mobility (PT)    Jeny Schuler PTA  12/13/2017

## 2017-12-13 NOTE — DISCHARGE SUMMARY
Patient Name: Henrietta Perez  MRN: [id number]  : 1945  DOS: 2017    Attending: Jonathan Edmond MD    Primary Care Provider: Basil Payan MD    Date of Admission:.2017  6:41 AM    Date of Discharge:  2017    Discharge Diagnosis: Principal Problem:    Status post reverse total replacement of right shoulder  Active Problems:    Arthropathy of shoulder region    Anxiety and depression    HLD (hyperlipidemia)    DM- new dx based on A1C      Hospital Course  Patient is a 72 y.o. female presented for scheduled surgery by .  She underwent right shoulder reverse total shoulder arthroplasty by Dr. Edmond under [place] and  admitted for further medical management.   Her shoulder has been painful for over a year, but severe the last 4 months. She had limited ROM.     Patient was provided pain medications as needed for pain control, along with interscalene nerve block infusion of Ropivacaine.    He block catheter had a malfunction and was removed, pt received a single shot block prior to discharge.    Her Hgb A1C was elevated on her pre-op labs. A diabetes educator provided her with diabetic education and a glucometer. She was also seen by dietician and provided diet education.    The patient was seen by OT and was taught exercises for right arm.    The patient used an IS for atelectasis prophylaxis and mechanicals for DVT prophylaxis.  Home medications were resumed as appropriate, and labs were monitored and remained fairly stable.     With the progress she has made, pt is ready for DC home today.    The patient will have an On Q pump ( instructed on it during this admit)  Discussed with patient regarding plan and she shows understanding and agreement.        Procedures Performed  Procedure(s):  TOTAL SHOULDER REVERSE ARTHROPLASTY RIGHT       OT:  Good progress with ROM HEP along with pain control despite nerve cath being pulled. Pt/family demonstrate good understanding of sling  use/donning/doffing and safety with shoulder precautions. Expected to d/c home w/family assist.        PT:  patient doing well with mobility, no AD needed for gait. Stair training provided without dififculty or concerns.    Pertinent Test Results:    I reviewed the patient's new clinical results.     Results from last 7 days  Lab Units 17  0514   WBC 10*3/mm3 13.72*   HEMOGLOBIN g/dL 11.8   HEMATOCRIT % 37.8   PLATELETS 10*3/mm3 253       Results from last 7 days  Lab Units 17  0514   SODIUM mmol/L 139   POTASSIUM mmol/L 4.5   CHLORIDE mmol/L 101   CO2 mmol/L 26.0   BUN mg/dL 12   CREATININE mg/dL 0.70   CALCIUM mg/dL 9.4   GLUCOSE mg/dL 110*     I reviewed the patient's new imaging including images and reports.      Discharge Assessment:    Vital Signs  /60 (BP Location: Left arm, Patient Position: Lying)  Pulse 72  Temp 99.1 °F (37.3 °C) (Oral)   Resp 16  SpO2 94%  Temp (24hrs), Av.6 °F (37 °C), Min:98 °F (36.7 °C), Max:99.1 °F (37.3 °C)      General Appearance:    Alert, cooperative, in no acute distress   Lungs:     Clear to auscultation,respirations regular, even and                   unlabored    Heart:    Regular rhythm and normal rate, normal S1 and S2    Abdomen:     Normal bowel sounds, no masses, no organomegaly, soft        non-tender, non-distended, no guarding, no rebound                 tenderness   Extremities:   RUE in a sling. Dressing CDI. Moves digits well, normal cap refill   Pulses:   Pulses palpable and equal bilaterally   Skin:   No bleeding, bruising or rash            Discharge Disposition: Home.    Discharge Medications   Henrietta Perez   Home Medication Instructions DANIELLE:[id number]    Printed on:17 6201   Medication Information                      diazePAM (VALIUM) 5 MG tablet  Take 5 mg by mouth At Night As Needed for Anxiety.             docusate sodium 100 MG capsule  Take 100 mg by mouth 2 (Two) Times a Day As Needed (constipation.). Over the  counter.             FLUoxetine (PROzac) 40 MG capsule  Take 40 mg by mouth Daily.             nitroglycerin (NITROSTAT) 0.4 MG SL tablet  Place 0.4 mg under the tongue Every 5 (Five) Minutes As Needed for Chest Pain. Take no more than 3 doses in 15 minutes.             oxyCODONE-acetaminophen (PERCOCET) 7.5-325 MG per tablet  Take 1 tablet by mouth Every 4 (Four) Hours As Needed for Moderate Pain  for up to 9 days.             Pitavastatin Calcium 4 MG tablet  Take 4 mg by mouth Every Night.             predniSONE (DELTASONE) 2.5 MG tablet  Take 2.5 mg by mouth Daily.                 Discharge Diet: Regular.    Activity at Discharge: exercises and restrictions per ortho.    Follow-up Appointments   per his orders.  Discharge took over 30 min  Discussed with pt discharge instructions, and answered all questions.       Bossman Walker MD  12/13/17  1:59 PM

## 2017-12-13 NOTE — PLAN OF CARE
Problem: Patient Care Overview (Adult)  Goal: Plan of Care Review  Outcome: Ongoing (interventions implemented as appropriate)    12/13/17 1407   Coping/Psychosocial Response Interventions   Plan Of Care Reviewed With patient   Outcome Evaluation   Outcome Summary/Follow up Plan Good progress with ROM HEP along with pain control despite nerve cath being pulled. Pt/family demonstrate good understanding of sling use/donning/doffing and safety with shoulder precautions. Expected to d/c home w/family assist.          Problem: Inpatient Occupational Therapy  Goal: Transfer Training Goal 1 LTG- OT  Outcome: Ongoing (interventions implemented as appropriate)    12/12/17 1445 12/13/17 1407   Transfer Training OT LTG   Transfer Training OT LTG, Time to Achieve 3 days --    Transfer Training OT LTG, Activity Type toilet;bed to chair /chair to bed --    Transfer Training OT LTG, [place] Level supervision required --    Transfer Training OT LTG, Outcome --  goal partially met       Goal: Range of Motion Goal LTG- OT  Outcome: Outcome(s) achieved Date Met:  12/13/17 12/12/17 1445 12/13/17 1407   Range of Motion OT LTG   Range of Motion Goal OT LTG, Time to Achieve 3 days --    Range of Motion Goal OT LTG, Additional Goal Pt/family to complete correct completion of RUE ROM HEP per MD protocol by d/c  --    Range of Motion Goal OT LTG, Outcome --  goal met       Goal: UB Dressing Goal LTG- OT  Outcome: Outcome(s) achieved Date Met:  12/13/17 12/12/17 1445 12/13/17 1407   UB Dressing OT LTG   UB Dressing Goal OT LTG, Time to Achieve 3 days --    UB Dressing Goal OT LTG, [place] Level moderate assist (50% patient effort) --    UB Dressing Goal OT LTG, Additional Goal pt/family to complete correct donning/doffing of sling and UB clothing by d/c  --    UB Dressing Goal OT LTG, Outcome --  goal met

## 2017-12-13 NOTE — PLAN OF CARE
Problem: Patient Care Overview (Adult)  Goal: Plan of Care Review  Outcome: Ongoing (interventions implemented as appropriate)    12/13/17 1024   Coping/Psychosocial Response Interventions   Plan Of Care Reviewed With patient   Patient Care Overview   Progress improving   Outcome Evaluation   Outcome Summary/Follow up Plan patient doing well with mobility, no AD needed for gait. Stair training provided without dififculty or concerns.         Problem: Inpatient Physical Therapy  Goal: Bed Mobility Goal LTG- PT  Outcome: Ongoing (interventions implemented as appropriate)    12/12/17 1538 12/13/17 1024   Bed Mobility PT LTG   Bed Mobility PT LTG, Date Established 12/12/17 --    Bed Mobility PT LTG, Time to Achieve 2 wks --    Bed Mobility PT LTG, Activity Type supine to sit/sit to supine --    Bed Mobility PT LTG, [place] Level contact guard assist --    Bed Mobility PT LTG, Date Goal Reviewed --  12/13/17   Bed Mobility PT LTG, Outcome --  goal ongoing       Goal: Transfer Training Goal 1 LTG- PT  Outcome: Ongoing (interventions implemented as appropriate)    12/12/17 1538 12/13/17 1024   Transfer Training PT LTG   Transfer Training PT LTG, Date Established 12/12/17 --    Transfer Training PT LTG, Time to Achieve 2 - 3 days --    Transfer Training PT LTG, Activity Type sit to stand/stand to sit --    Transfer Training PT LTG, [place] Level independent --    Transfer Training PT LTG, Date Goal Reviewed --  12/13/17   Transfer Training PT LTG, Outcome --  goal ongoing       Goal: Gait Training Goal LTG- PT  Outcome: Ongoing (interventions implemented as appropriate)    12/12/17 1538 12/13/17 1024   Gait Training PT LTG   Gait Training Goal PT LTG, Date Established 12/12/17 --    Gait Training Goal PT LTG, Time to Achieve 2 - 3 days --    Gait Training Goal PT LTG, [place] Level independent --    Gait Training Goal PT LTG, Distance to Achieve 150 --    Gait Training Goal PT LTG, Date Goal Reviewed --   12/13/17   Gait Training Goal PT LTG, Outcome --  goal ongoing       Goal: Stair Training Goal LTG- PT  Outcome: Ongoing (interventions implemented as appropriate)    12/12/17 1538 12/13/17 1024   Stair Training PT LTG   Stair Training Goal PT LTG, Date Established 12/12/17 --    Stair Training Goal PT LTG, Time to Achieve 2 - 3 days --    Stair Training Goal PT LTG, Number of Steps 3 --    Stair Training Goal PT LTG, [place] Level supervision required --    Stair Training Goal PT LTG, Assist Device cane, straight --    Stair Training Goal PT LTG, Date Goal Reviewed --  12/13/17   Stair Training Goal PT LTG, Outcome --  goal ongoing

## 2017-12-15 LAB
ABO + RH BLD: NORMAL
ABO + RH BLD: NORMAL
BH BB BLOOD EXPIRATION DATE: NORMAL
BH BB BLOOD EXPIRATION DATE: NORMAL
BH BB BLOOD TYPE BARCODE: 5100
BH BB BLOOD TYPE BARCODE: 5100
BH BB DISPENSE STATUS: NORMAL
BH BB DISPENSE STATUS: NORMAL
BH BB PRODUCT CODE: NORMAL
BH BB PRODUCT CODE: NORMAL
BH BB UNIT NUMBER: NORMAL
BH BB UNIT NUMBER: NORMAL
UNIT  ABO: NORMAL
UNIT  ABO: NORMAL
UNIT  RH: NORMAL
UNIT  RH: NORMAL

## 2021-12-29 ENCOUNTER — APPOINTMENT (OUTPATIENT)
Dept: GENERAL RADIOLOGY | Facility: HOSPITAL | Age: 76
End: 2021-12-29

## 2021-12-29 ENCOUNTER — HOSPITAL ENCOUNTER (EMERGENCY)
Facility: HOSPITAL | Age: 76
Discharge: HOME | End: 2021-12-29
Attending: EMERGENCY MEDICINE | Admitting: EMERGENCY MEDICINE

## 2021-12-29 ENCOUNTER — APPOINTMENT (OUTPATIENT)
Dept: CT IMAGING | Facility: HOSPITAL | Age: 76
End: 2021-12-29

## 2021-12-29 VITALS
TEMPERATURE: 97.6 F | DIASTOLIC BLOOD PRESSURE: 65 MMHG | OXYGEN SATURATION: 93 % | SYSTOLIC BLOOD PRESSURE: 119 MMHG | RESPIRATION RATE: 16 BRPM | BODY MASS INDEX: 26.46 KG/M2 | HEIGHT: 64 IN | WEIGHT: 155 LBS | HEART RATE: 88 BPM

## 2021-12-29 DIAGNOSIS — R53.1 GENERALIZED WEAKNESS: ICD-10-CM

## 2021-12-29 DIAGNOSIS — I77.6 VASCULITIS: Primary | ICD-10-CM

## 2021-12-29 LAB
ALBUMIN SERPL-MCNC: 2.9 G/DL (ref 3.5–5.2)
ALBUMIN/GLOB SERPL: 0.7 G/DL
ALP SERPL-CCNC: 198 U/L (ref 39–117)
ALT SERPL W P-5'-P-CCNC: 93 U/L (ref 1–33)
ANION GAP SERPL CALCULATED.3IONS-SCNC: 10 MMOL/L (ref 5–15)
AST SERPL-CCNC: 201 U/L (ref 1–32)
BACTERIA UR QL AUTO: ABNORMAL /HPF
BASOPHILS # BLD AUTO: 0.08 10*3/MM3 (ref 0–0.2)
BASOPHILS NFR BLD AUTO: 1.4 % (ref 0–1.5)
BILIRUB SERPL-MCNC: 0.5 MG/DL (ref 0–1.2)
BILIRUB UR QL STRIP: ABNORMAL
BUN SERPL-MCNC: 13 MG/DL (ref 8–23)
BUN/CREAT SERPL: 16.5 (ref 7–25)
CALCIUM SPEC-SCNC: 9.4 MG/DL (ref 8.6–10.5)
CHLORIDE SERPL-SCNC: 104 MMOL/L (ref 98–107)
CK SERPL-CCNC: 71 U/L (ref 20–180)
CLARITY UR: CLEAR
CO2 SERPL-SCNC: 25 MMOL/L (ref 22–29)
COLOR UR: ABNORMAL
CREAT SERPL-MCNC: 0.79 MG/DL (ref 0.57–1)
DEPRECATED RDW RBC AUTO: 54.4 FL (ref 37–54)
EOSINOPHIL # BLD AUTO: 0.22 10*3/MM3 (ref 0–0.4)
EOSINOPHIL NFR BLD AUTO: 3.8 % (ref 0.3–6.2)
ERYTHROCYTE [DISTWIDTH] IN BLOOD BY AUTOMATED COUNT: 15.8 % (ref 12.3–15.4)
GFR SERPL CREATININE-BSD FRML MDRD: 70 ML/MIN/1.73
GLOBULIN UR ELPH-MCNC: 4.2 GM/DL
GLUCOSE SERPL-MCNC: 158 MG/DL (ref 65–99)
GLUCOSE UR STRIP-MCNC: NEGATIVE MG/DL
HCT VFR BLD AUTO: 42.4 % (ref 34–46.6)
HGB BLD-MCNC: 13.8 G/DL (ref 12–15.9)
HGB UR QL STRIP.AUTO: NEGATIVE
HOLD SPECIMEN: NORMAL
HYALINE CASTS UR QL AUTO: ABNORMAL /LPF
IMM GRANULOCYTES # BLD AUTO: 0.02 10*3/MM3 (ref 0–0.05)
IMM GRANULOCYTES NFR BLD AUTO: 0.3 % (ref 0–0.5)
KETONES UR QL STRIP: ABNORMAL
LEUKOCYTE ESTERASE UR QL STRIP.AUTO: ABNORMAL
LYMPHOCYTES # BLD AUTO: 2.36 10*3/MM3 (ref 0.7–3.1)
LYMPHOCYTES NFR BLD AUTO: 40.8 % (ref 19.6–45.3)
MAGNESIUM SERPL-MCNC: 1.9 MG/DL (ref 1.6–2.4)
MCH RBC QN AUTO: 30.7 PG (ref 26.6–33)
MCHC RBC AUTO-ENTMCNC: 32.5 G/DL (ref 31.5–35.7)
MCV RBC AUTO: 94.2 FL (ref 79–97)
MONOCYTES # BLD AUTO: 0.88 10*3/MM3 (ref 0.1–0.9)
MONOCYTES NFR BLD AUTO: 15.2 % (ref 5–12)
NEUTROPHILS NFR BLD AUTO: 2.23 10*3/MM3 (ref 1.7–7)
NEUTROPHILS NFR BLD AUTO: 38.5 % (ref 42.7–76)
NITRITE UR QL STRIP: NEGATIVE
NRBC BLD AUTO-RTO: 0 /100 WBC (ref 0–0.2)
PH UR STRIP.AUTO: 5.5 [PH] (ref 5–8)
PLATELET # BLD AUTO: 174 10*3/MM3 (ref 140–450)
PMV BLD AUTO: 11.1 FL (ref 6–12)
POTASSIUM SERPL-SCNC: 4.3 MMOL/L (ref 3.5–5.2)
PROT SERPL-MCNC: 7.1 G/DL (ref 6–8.5)
PROT UR QL STRIP: ABNORMAL
QT INTERVAL: 358 MS
QTC INTERVAL: 449 MS
RBC # BLD AUTO: 4.5 10*6/MM3 (ref 3.77–5.28)
RBC # UR STRIP: ABNORMAL /HPF
REF LAB TEST METHOD: ABNORMAL
SODIUM SERPL-SCNC: 139 MMOL/L (ref 136–145)
SP GR UR STRIP: >=1.03 (ref 1–1.03)
SQUAMOUS #/AREA URNS HPF: ABNORMAL /HPF
T4 FREE SERPL-MCNC: 1.04 NG/DL (ref 0.93–1.7)
TROPONIN T SERPL-MCNC: <0.01 NG/ML (ref 0–0.03)
TSH SERPL DL<=0.05 MIU/L-ACNC: 2.1 UIU/ML (ref 0.27–4.2)
UROBILINOGEN UR QL STRIP: ABNORMAL
WBC # UR STRIP: ABNORMAL /HPF
WBC NRBC COR # BLD: 5.79 10*3/MM3 (ref 3.4–10.8)
WHOLE BLOOD HOLD SPECIMEN: NORMAL
WHOLE BLOOD HOLD SPECIMEN: NORMAL

## 2021-12-29 PROCEDURE — 84439 ASSAY OF FREE THYROXINE: CPT | Performed by: EMERGENCY MEDICINE

## 2021-12-29 PROCEDURE — 71045 X-RAY EXAM CHEST 1 VIEW: CPT

## 2021-12-29 PROCEDURE — 85025 COMPLETE CBC W/AUTO DIFF WBC: CPT | Performed by: EMERGENCY MEDICINE

## 2021-12-29 PROCEDURE — 80053 COMPREHEN METABOLIC PANEL: CPT | Performed by: EMERGENCY MEDICINE

## 2021-12-29 PROCEDURE — 82550 ASSAY OF CK (CPK): CPT | Performed by: EMERGENCY MEDICINE

## 2021-12-29 PROCEDURE — 70450 CT HEAD/BRAIN W/O DYE: CPT

## 2021-12-29 PROCEDURE — 84443 ASSAY THYROID STIM HORMONE: CPT | Performed by: EMERGENCY MEDICINE

## 2021-12-29 PROCEDURE — 81001 URINALYSIS AUTO W/SCOPE: CPT | Performed by: EMERGENCY MEDICINE

## 2021-12-29 PROCEDURE — 93005 ELECTROCARDIOGRAM TRACING: CPT | Performed by: EMERGENCY MEDICINE

## 2021-12-29 PROCEDURE — 83735 ASSAY OF MAGNESIUM: CPT | Performed by: EMERGENCY MEDICINE

## 2021-12-29 PROCEDURE — 99283 EMERGENCY DEPT VISIT LOW MDM: CPT

## 2021-12-29 PROCEDURE — 84484 ASSAY OF TROPONIN QUANT: CPT | Performed by: EMERGENCY MEDICINE

## 2021-12-29 RX ORDER — PIOGLITAZONEHYDROCHLORIDE 15 MG/1
TABLET ORAL
COMMUNITY

## 2021-12-29 RX ORDER — OMEPRAZOLE 40 MG/1
CAPSULE, DELAYED RELEASE ORAL
COMMUNITY

## 2021-12-29 RX ORDER — CYCLOBENZAPRINE HCL 10 MG
TABLET ORAL
COMMUNITY

## 2021-12-29 RX ORDER — SODIUM CHLORIDE 0.9 % (FLUSH) 0.9 %
10 SYRINGE (ML) INJECTION AS NEEDED
Status: DISCONTINUED | OUTPATIENT
Start: 2021-12-29 | End: 2021-12-29 | Stop reason: HOSPADM

## 2021-12-29 RX ORDER — HYDROCODONE BITARTRATE AND ACETAMINOPHEN 7.5; 325 MG/1; MG/1
TABLET ORAL
COMMUNITY

## 2021-12-29 RX ORDER — PREDNISONE 20 MG/1
20 TABLET ORAL 2 TIMES DAILY
Qty: 14 TABLET | Refills: 0 | Status: SHIPPED | OUTPATIENT
Start: 2021-12-29 | End: 2022-01-05

## 2021-12-29 RX ORDER — NAPROXEN 500 MG/1
TABLET ORAL
COMMUNITY

## 2021-12-29 RX ORDER — DULOXETIN HYDROCHLORIDE 60 MG/1
CAPSULE, DELAYED RELEASE ORAL
COMMUNITY

## 2021-12-29 RX ORDER — DOXYCYCLINE HYCLATE 100 MG
TABLET ORAL
COMMUNITY

## 2021-12-29 RX ORDER — FAMOTIDINE 40 MG/1
TABLET, FILM COATED ORAL
COMMUNITY

## 2022-04-11 ENCOUNTER — TELEPHONE (OUTPATIENT)
Dept: NEUROSURGERY | Facility: CLINIC | Age: 77
End: 2022-04-11

## 2022-04-11 NOTE — TELEPHONE ENCOUNTER
PATIENT'S  CALLED AND STATES A REFERRAL WAS SENT FROM DR. WOOD @ [phone] TO DR. VILLALOBOS, NO REFERRAL IN CHART.      JINA STATES FROM REFERRING THAT SHE TRIED THE HUB TWICE ON Friday TO SEND IT.  SHE WILL TRY AGAIN TODAY

## 2022-07-17 ENCOUNTER — OUTSIDE FACILITY SERVICE (OUTPATIENT)
Dept: CARDIOLOGY | Facility: CLINIC | Age: 77
End: 2022-07-17

## 2022-07-17 PROCEDURE — 93306 TTE W/DOPPLER COMPLETE: CPT | Performed by: INTERNAL MEDICINE

## (undated) DEVICE — GLV SURG TRIUMPH ORTHO W/ALOE PF LTX 7.0

## (undated) DEVICE — GLV SURG DERMASSURE GRN LF PF 7.0

## (undated) DEVICE — MEDI-VAC NON-CONDUCTIVE SUCTION TUBING: Brand: CARDINAL HEALTH

## (undated) DEVICE — MEDI-VAC YANKAUER SUCTION HANDLE W/BULBOUS TIP: Brand: CARDINAL HEALTH

## (undated) DEVICE — ST NERV BLCK CONT CONTIPLEX ECHO CLSD 18G 4IN

## (undated) DEVICE — T-MAX DISPOSABLE FACE MASK 8 PER BOX

## (undated) DEVICE — CANNULA,OXY,ADULT,SUPERSOFT,W/7'TUB,UC: Brand: MEDLINE

## (undated) DEVICE — FLTR HME STR UNIV W/SMPL PORT

## (undated) DEVICE — SENSR O2 OXIMAX FNGR A/ 18IN NONSTR

## (undated) DEVICE — NERVE BLOCK SUPPORT KIT/BLUE: Brand: MEDLINE INDUSTRIES, INC.

## (undated) DEVICE — KT PUMP PAIN ONQ CBLOC SELCTAFLO 400ML

## (undated) DEVICE — ANTIBACTERIAL UNDYED BRAIDED (POLYGLACTIN 910), SYNTHETIC ABSORBABLE SUTURE: Brand: COATED VICRYL

## (undated) DEVICE — GLV SURG TRIUMPH LT PF LTX 8 STRL

## (undated) DEVICE — INTENDED USE FOR SURGICAL MARKING ON INTACT SKIN, ALSO PROVIDES A PERMANENT METHOD OF IDENTIFYING OBJECTS IN THE OPERATING ROOM: Brand: WRITESITE® REGULAR TIP SKIN MARKER

## (undated) DEVICE — SYS SKIN CLS DERMABOND PRINEO W/22CM MESH TP

## (undated) DEVICE — SOL LR 1000ML

## (undated) DEVICE — STRYKER PERFORMANCE SERIES SAGITTAL BLADE: Brand: STRYKER PERFORMANCE SERIES

## (undated) DEVICE — T4 PULLOVER TOGA, LARGE

## (undated) DEVICE — ADAPT ST INFUS ADMIN SYR 70IN

## (undated) DEVICE — SHOULDER STABILIZATION KIT,                                    DISPOSABLE 12 PER BOX

## (undated) DEVICE — PK MAJ SHLDR SPLT 10

## (undated) DEVICE — AIRWY 90MM NO9

## (undated) DEVICE — DRSNG WND GZ PAD BORDERED 4X8IN STRL

## (undated) DEVICE — GLV SURG SIGNATURE TOUCH PF LTX 8 STRL BX/50

## (undated) DEVICE — 3M™ TEGADERM™ CHG DRESSING 25/CARTON 4 CARTONS/CASE 1658: Brand: TEGADERM™